# Patient Record
Sex: FEMALE | Race: WHITE | NOT HISPANIC OR LATINO | URBAN - METROPOLITAN AREA
[De-identification: names, ages, dates, MRNs, and addresses within clinical notes are randomized per-mention and may not be internally consistent; named-entity substitution may affect disease eponyms.]

---

## 2020-03-15 ENCOUNTER — INPATIENT (INPATIENT)
Facility: HOSPITAL | Age: 65
LOS: 10 days | Discharge: NURSING, REHAB OR LONG-TERM CARE | DRG: 194 | End: 2020-03-26
Attending: INTERNAL MEDICINE | Admitting: INTERNAL MEDICINE
Payer: COMMERCIAL

## 2020-03-15 VITALS
RESPIRATION RATE: 18 BRPM | HEART RATE: 73 BPM | SYSTOLIC BLOOD PRESSURE: 113 MMHG | OXYGEN SATURATION: 96 % | TEMPERATURE: 101 F | DIASTOLIC BLOOD PRESSURE: 72 MMHG

## 2020-03-15 DIAGNOSIS — E03.9 HYPOTHYROIDISM, UNSPECIFIED: ICD-10-CM

## 2020-03-15 DIAGNOSIS — Z29.9 ENCOUNTER FOR PROPHYLACTIC MEASURES, UNSPECIFIED: ICD-10-CM

## 2020-03-15 DIAGNOSIS — M79.7 FIBROMYALGIA: ICD-10-CM

## 2020-03-15 DIAGNOSIS — Z91.89 OTHER SPECIFIED PERSONAL RISK FACTORS, NOT ELSEWHERE CLASSIFIED: ICD-10-CM

## 2020-03-15 DIAGNOSIS — M06.9 RHEUMATOID ARTHRITIS, UNSPECIFIED: ICD-10-CM

## 2020-03-15 DIAGNOSIS — R63.8 OTHER SYMPTOMS AND SIGNS CONCERNING FOOD AND FLUID INTAKE: ICD-10-CM

## 2020-03-15 DIAGNOSIS — F32.9 MAJOR DEPRESSIVE DISORDER, SINGLE EPISODE, UNSPECIFIED: ICD-10-CM

## 2020-03-15 DIAGNOSIS — M81.0 AGE-RELATED OSTEOPOROSIS WITHOUT CURRENT PATHOLOGICAL FRACTURE: ICD-10-CM

## 2020-03-15 LAB
ALBUMIN SERPL ELPH-MCNC: 3.5 G/DL — SIGNIFICANT CHANGE UP (ref 3.3–5)
ALP SERPL-CCNC: 74 U/L — SIGNIFICANT CHANGE UP (ref 40–120)
ALT FLD-CCNC: 97 U/L — HIGH (ref 10–45)
ANION GAP SERPL CALC-SCNC: 11 MMOL/L — SIGNIFICANT CHANGE UP (ref 5–17)
APPEARANCE UR: CLEAR — SIGNIFICANT CHANGE UP
APTT BLD: 31.9 SEC — SIGNIFICANT CHANGE UP (ref 27.5–36.3)
AST SERPL-CCNC: 112 U/L — HIGH (ref 10–40)
BASOPHILS # BLD AUTO: 0.03 K/UL — SIGNIFICANT CHANGE UP (ref 0–0.2)
BASOPHILS NFR BLD AUTO: 0.7 % — SIGNIFICANT CHANGE UP (ref 0–2)
BILIRUB SERPL-MCNC: 0.3 MG/DL — SIGNIFICANT CHANGE UP (ref 0.2–1.2)
BILIRUB UR-MCNC: ABNORMAL
BUN SERPL-MCNC: 11 MG/DL — SIGNIFICANT CHANGE UP (ref 7–23)
CALCIUM SERPL-MCNC: 8.8 MG/DL — SIGNIFICANT CHANGE UP (ref 8.4–10.5)
CHLORIDE SERPL-SCNC: 97 MMOL/L — SIGNIFICANT CHANGE UP (ref 96–108)
CO2 SERPL-SCNC: 25 MMOL/L — SIGNIFICANT CHANGE UP (ref 22–31)
COLOR SPEC: YELLOW — SIGNIFICANT CHANGE UP
CREAT SERPL-MCNC: 0.77 MG/DL — SIGNIFICANT CHANGE UP (ref 0.5–1.3)
CRP SERPL-MCNC: 2.8 MG/DL — HIGH (ref 0–0.4)
DIFF PNL FLD: NEGATIVE — SIGNIFICANT CHANGE UP
EOSINOPHIL # BLD AUTO: 0.02 K/UL — SIGNIFICANT CHANGE UP (ref 0–0.5)
EOSINOPHIL NFR BLD AUTO: 0.5 % — SIGNIFICANT CHANGE UP (ref 0–6)
FERRITIN SERPL-MCNC: 104 NG/ML — SIGNIFICANT CHANGE UP (ref 15–150)
GAS PNL BLDV: SIGNIFICANT CHANGE UP
GLUCOSE SERPL-MCNC: 92 MG/DL — SIGNIFICANT CHANGE UP (ref 70–99)
GLUCOSE UR QL: NEGATIVE — SIGNIFICANT CHANGE UP
HCT VFR BLD CALC: 39.3 % — SIGNIFICANT CHANGE UP (ref 34.5–45)
HGB BLD-MCNC: 12.1 G/DL — SIGNIFICANT CHANGE UP (ref 11.5–15.5)
IMM GRANULOCYTES NFR BLD AUTO: 0.2 % — SIGNIFICANT CHANGE UP (ref 0–1.5)
INR BLD: 1.14 — SIGNIFICANT CHANGE UP (ref 0.88–1.16)
KETONES UR-MCNC: NEGATIVE — SIGNIFICANT CHANGE UP
LACTATE SERPL-SCNC: 1.3 MMOL/L — SIGNIFICANT CHANGE UP (ref 0.5–2)
LDH SERPL L TO P-CCNC: 436 U/L — HIGH (ref 50–242)
LEUKOCYTE ESTERASE UR-ACNC: ABNORMAL
LYMPHOCYTES # BLD AUTO: 1.22 K/UL — SIGNIFICANT CHANGE UP (ref 1–3.3)
LYMPHOCYTES # BLD AUTO: 29.2 % — SIGNIFICANT CHANGE UP (ref 13–44)
MCHC RBC-ENTMCNC: 25.2 PG — LOW (ref 27–34)
MCHC RBC-ENTMCNC: 30.8 GM/DL — LOW (ref 32–36)
MCV RBC AUTO: 81.9 FL — SIGNIFICANT CHANGE UP (ref 80–100)
MONOCYTES # BLD AUTO: 0.54 K/UL — SIGNIFICANT CHANGE UP (ref 0–0.9)
MONOCYTES NFR BLD AUTO: 12.9 % — SIGNIFICANT CHANGE UP (ref 2–14)
NEUTROPHILS # BLD AUTO: 2.36 K/UL — SIGNIFICANT CHANGE UP (ref 1.8–7.4)
NEUTROPHILS NFR BLD AUTO: 56.5 % — SIGNIFICANT CHANGE UP (ref 43–77)
NITRITE UR-MCNC: NEGATIVE — SIGNIFICANT CHANGE UP
NRBC # BLD: 0 /100 WBCS — SIGNIFICANT CHANGE UP (ref 0–0)
PH UR: 6 — SIGNIFICANT CHANGE UP (ref 5–8)
PLATELET # BLD AUTO: 227 K/UL — SIGNIFICANT CHANGE UP (ref 150–400)
POTASSIUM SERPL-MCNC: 4.6 MMOL/L — SIGNIFICANT CHANGE UP (ref 3.5–5.3)
POTASSIUM SERPL-SCNC: 4.6 MMOL/L — SIGNIFICANT CHANGE UP (ref 3.5–5.3)
PROT SERPL-MCNC: 8.2 G/DL — SIGNIFICANT CHANGE UP (ref 6–8.3)
PROT UR-MCNC: ABNORMAL MG/DL
PROTHROM AB SERPL-ACNC: 13.1 SEC — HIGH (ref 10–12.9)
RAPID RVP RESULT: SIGNIFICANT CHANGE UP
RBC # BLD: 4.8 M/UL — SIGNIFICANT CHANGE UP (ref 3.8–5.2)
RBC # FLD: 17.1 % — HIGH (ref 10.3–14.5)
SODIUM SERPL-SCNC: 133 MMOL/L — LOW (ref 135–145)
SP GR SPEC: >=1.03 — SIGNIFICANT CHANGE UP (ref 1–1.03)
UROBILINOGEN FLD QL: 0.2 E.U./DL — SIGNIFICANT CHANGE UP
WBC # BLD: 4.18 K/UL — SIGNIFICANT CHANGE UP (ref 3.8–10.5)
WBC # FLD AUTO: 4.18 K/UL — SIGNIFICANT CHANGE UP (ref 3.8–10.5)

## 2020-03-15 PROCEDURE — 93010 ELECTROCARDIOGRAM REPORT: CPT

## 2020-03-15 PROCEDURE — 71045 X-RAY EXAM CHEST 1 VIEW: CPT | Mod: 26

## 2020-03-15 PROCEDURE — 99285 EMERGENCY DEPT VISIT HI MDM: CPT

## 2020-03-15 RX ORDER — HYDROXYCHLOROQUINE SULFATE 200 MG
200 TABLET ORAL EVERY 12 HOURS
Refills: 0 | Status: DISCONTINUED | OUTPATIENT
Start: 2020-03-15 | End: 2020-03-26

## 2020-03-15 RX ORDER — PROPRANOLOL HCL 160 MG
80 CAPSULE, EXTENDED RELEASE 24HR ORAL DAILY
Refills: 0 | Status: DISCONTINUED | OUTPATIENT
Start: 2020-03-15 | End: 2020-03-26

## 2020-03-15 RX ORDER — SODIUM CHLORIDE 9 MG/ML
1000 INJECTION INTRAMUSCULAR; INTRAVENOUS; SUBCUTANEOUS ONCE
Refills: 0 | Status: COMPLETED | OUTPATIENT
Start: 2020-03-15 | End: 2020-03-15

## 2020-03-15 RX ORDER — LEVOTHYROXINE SODIUM 125 MCG
0 TABLET ORAL
Qty: 0 | Refills: 0 | DISCHARGE

## 2020-03-15 RX ORDER — CITALOPRAM 10 MG/1
1 TABLET, FILM COATED ORAL
Qty: 0 | Refills: 0 | DISCHARGE

## 2020-03-15 RX ORDER — ALENDRONATE SODIUM 70 MG/1
1 TABLET ORAL
Qty: 0 | Refills: 0 | DISCHARGE

## 2020-03-15 RX ORDER — SULFASALAZINE 500 MG
0 TABLET ORAL
Qty: 0 | Refills: 0 | DISCHARGE

## 2020-03-15 RX ORDER — HYDROXYCHLOROQUINE SULFATE 200 MG
0 TABLET ORAL
Qty: 0 | Refills: 0 | DISCHARGE

## 2020-03-15 RX ORDER — ENOXAPARIN SODIUM 100 MG/ML
40 INJECTION SUBCUTANEOUS EVERY 24 HOURS
Refills: 0 | Status: DISCONTINUED | OUTPATIENT
Start: 2020-03-15 | End: 2020-03-26

## 2020-03-15 RX ORDER — PANTOPRAZOLE SODIUM 20 MG/1
0 TABLET, DELAYED RELEASE ORAL
Qty: 0 | Refills: 0 | DISCHARGE

## 2020-03-15 RX ORDER — PANTOPRAZOLE SODIUM 20 MG/1
20 TABLET, DELAYED RELEASE ORAL
Refills: 0 | Status: DISCONTINUED | OUTPATIENT
Start: 2020-03-15 | End: 2020-03-26

## 2020-03-15 RX ORDER — CITALOPRAM 10 MG/1
40 TABLET, FILM COATED ORAL DAILY
Refills: 0 | Status: DISCONTINUED | OUTPATIENT
Start: 2020-03-15 | End: 2020-03-26

## 2020-03-15 RX ORDER — SULFASALAZINE 500 MG
500 TABLET ORAL EVERY 8 HOURS
Refills: 0 | Status: DISCONTINUED | OUTPATIENT
Start: 2020-03-15 | End: 2020-03-26

## 2020-03-15 RX ORDER — CEFTRIAXONE 500 MG/1
1000 INJECTION, POWDER, FOR SOLUTION INTRAMUSCULAR; INTRAVENOUS ONCE
Refills: 0 | Status: COMPLETED | OUTPATIENT
Start: 2020-03-15 | End: 2020-03-15

## 2020-03-15 RX ORDER — ASPIRIN/CALCIUM CARB/MAGNESIUM 324 MG
81 TABLET ORAL DAILY
Refills: 0 | Status: DISCONTINUED | OUTPATIENT
Start: 2020-03-15 | End: 2020-03-26

## 2020-03-15 RX ORDER — ACETAMINOPHEN 500 MG
650 TABLET ORAL EVERY 6 HOURS
Refills: 0 | Status: DISCONTINUED | OUTPATIENT
Start: 2020-03-15 | End: 2020-03-23

## 2020-03-15 RX ORDER — ACETAMINOPHEN 500 MG
650 TABLET ORAL ONCE
Refills: 0 | Status: COMPLETED | OUTPATIENT
Start: 2020-03-15 | End: 2020-03-15

## 2020-03-15 RX ORDER — MAGNESIUM OXIDE 400 MG ORAL TABLET 241.3 MG
1 TABLET ORAL
Qty: 0 | Refills: 0 | DISCHARGE

## 2020-03-15 RX ORDER — SULFASALAZINE 500 MG
500 TABLET ORAL DAILY
Refills: 0 | Status: DISCONTINUED | OUTPATIENT
Start: 2020-03-15 | End: 2020-03-15

## 2020-03-15 RX ORDER — LEVOTHYROXINE SODIUM 125 MCG
25 TABLET ORAL DAILY
Refills: 0 | Status: DISCONTINUED | OUTPATIENT
Start: 2020-03-16 | End: 2020-03-26

## 2020-03-15 RX ORDER — AZITHROMYCIN 500 MG/1
500 TABLET, FILM COATED ORAL EVERY 24 HOURS
Refills: 0 | Status: COMPLETED | OUTPATIENT
Start: 2020-03-15 | End: 2020-03-17

## 2020-03-15 RX ORDER — ASPIRIN/CALCIUM CARB/MAGNESIUM 324 MG
1 TABLET ORAL
Qty: 0 | Refills: 0 | DISCHARGE

## 2020-03-15 RX ORDER — PROPRANOLOL HCL 160 MG
0 CAPSULE, EXTENDED RELEASE 24HR ORAL
Qty: 0 | Refills: 0 | DISCHARGE

## 2020-03-15 RX ORDER — CEFTRIAXONE 500 MG/1
1000 INJECTION, POWDER, FOR SOLUTION INTRAMUSCULAR; INTRAVENOUS EVERY 24 HOURS
Refills: 0 | Status: COMPLETED | OUTPATIENT
Start: 2020-03-16 | End: 2020-03-19

## 2020-03-15 RX ADMIN — Medication 500 MILLIGRAM(S): at 19:50

## 2020-03-15 RX ADMIN — Medication 81 MILLIGRAM(S): at 19:50

## 2020-03-15 RX ADMIN — CEFTRIAXONE 100 MILLIGRAM(S): 500 INJECTION, POWDER, FOR SOLUTION INTRAMUSCULAR; INTRAVENOUS at 11:00

## 2020-03-15 RX ADMIN — Medication 100 MILLIGRAM(S): at 19:50

## 2020-03-15 RX ADMIN — ENOXAPARIN SODIUM 40 MILLIGRAM(S): 100 INJECTION SUBCUTANEOUS at 19:50

## 2020-03-15 RX ADMIN — Medication 200 MILLIGRAM(S): at 19:50

## 2020-03-15 RX ADMIN — Medication 650 MILLIGRAM(S): at 11:22

## 2020-03-15 RX ADMIN — CEFTRIAXONE 1000 MILLIGRAM(S): 500 INJECTION, POWDER, FOR SOLUTION INTRAMUSCULAR; INTRAVENOUS at 11:54

## 2020-03-15 RX ADMIN — AZITHROMYCIN 500 MILLIGRAM(S): 500 TABLET, FILM COATED ORAL at 19:49

## 2020-03-15 RX ADMIN — Medication 650 MILLIGRAM(S): at 14:56

## 2020-03-15 RX ADMIN — SODIUM CHLORIDE 1000 MILLILITER(S): 9 INJECTION INTRAMUSCULAR; INTRAVENOUS; SUBCUTANEOUS at 10:48

## 2020-03-15 NOTE — ED ADULT NURSE NOTE - NSIMPLEMENTINTERV_GEN_ALL_ED
Implemented All Fall Risk Interventions:  [place] to call system. Call bell, personal items and telephone within reach. Instruct patient to call for assistance. Room bathroom lighting operational. Non-slip footwear when patient is off stretcher. Physically safe environment: no spills, clutter or unnecessary equipment. Stretcher in lowest position, wheels locked, appropriate side rails in place. Provide visual cue, wrist band, yellow gown, etc. Monitor gait and stability. Monitor for mental status changes and reorient to person, place, and time. Review medications for side effects contributing to fall risk. Reinforce activity limits and safety measures with patient and family.

## 2020-03-15 NOTE — H&P ADULT - PROBLEM SELECTOR PLAN 1
5 day hx of subjective fever, dry cough, with elevated CRP and LDH (pending ferritin), no lymphopenia, concern for COVID-19 [facility]  - f/u COVID-19 PCR  - C/w ceftriaxone 1g daily and azithromycin 500 for 3 days for CAP treatment for now  - f/u urine legionella

## 2020-03-15 NOTE — ED ADULT NURSE NOTE - OTHER COMPLAINTS
Transferred from [facility] to /o [place] [address]. Per EMS, someone else on the patient's floor was confirmed positive.

## 2020-03-15 NOTE — PATIENT PROFILE ADULT - STATED REASON FOR ADMISSION
Known positive Covid-19 on the same floor patient is on at [facility] and started presenting with fevers and cough.

## 2020-03-15 NOTE — H&P ADULT - HISTORY OF PRESENT ILLNESS
64F with rheumatoid arthritis, fibromyalgia, presents from [facility] complaining of a 4-5 day of subjective fevers and a dry cough (resolved approximately 2 days ago). Patient expressed concern for COVID-19 given that numerous residents at the facility were in contact with a COVID-19 positive patient. Patient had not been directly in contact with any COVID-19 positive patient, and her roommate is not positive as well, nor symptomatic.    63 y/o F living in a nursing home PMHx RA, and fibromyalgia presents to the ED with c/o fever x 5 D and a cough that had resolved 2 D ago. Denies SOB, abdominal pain, nausea, vomiting or other complaints. Pt is concerned for COVID19 though she was not in direct contact w/ a +COVID19 pt. Room mate asymptomatic. 64F with rheumatoid arthritis, fibromyalgia, presents from [facility] complaining of a 4-5 day of subjective fevers and a dry cough (resolved approximately 2 days ago). Patient expressed concern for COVID-19 given that numerous residents at the facility were in contact with a COVID-19 positive patient. Patient had not been directly in contact with any COVID-19 positive patient, and her roommate is not positive as well, nor symptomatic. Patient during time of evaluation in the ED felt "fine, I am only concerned whether or not I have the virus, and why I am sweating so much. I feel very hot." Patent denies chills, nausea, vomiting, changes in bowel movement (had 1 bowel movement during encounter). Patient otherwise feels close to her baseline, when asked scale 1-10, 10 being 100% normal, patient stated 8-9.    ED vitals: T 101.3, HR 73, /72, RR 18, 96%  ED labs: Na 113, , ALT 97, U/A dirty (negative), RVP negative  ED studies: none  ED course: 1L NS bolus, Ceftriaxone 1g at 11 AM 64F with rheumatoid arthritis, fibromyalgia, presents from [facility] complaining of a 4-5 day of subjective fevers and a dry cough (resolved approximately 2 days ago). Patient expressed concern for COVID-19 given that numerous residents at the facility were in contact with a COVID-19 positive patient. Patient had not been directly in contact with any COVID-19 positive patient, and her roommate is not positive as well, nor symptomatic. Patient during time of evaluation in the ED felt "fine, I am only concerned whether or not I have the virus, and why I am sweating so much. I feel very hot." Patent denies chills, nausea, vomiting, changes in bowel movement (had 1 bowel movement during encounter). Patient otherwise feels close to her baseline, when asked scale 1-10, 10 being 100% normal, patient stated 8-9.    ED vitals: T 101.3, HR 73, /72, RR 18, 96%  ED labs: Na 113, , ALT 97, U/A dirty (negative), RVP negative  ED studies: CXR w/ elevated R hemidiaphragm, b/l increased interstitial markings  ED course: 1L NS bolus, Ceftriaxone 1g at 11 AM 64F with rheumatoid arthritis, fibromyalgia, presents from [facility] complaining of a 4-5 day of subjective fevers and a dry cough (resolved approximately 2 days ago). Patient expressed concern for COVID-19 given that numerous residents at the facility were in contact with a COVID-19 positive patient. Patient had not been directly in contact with any COVID-19 positive patient, and her roommate is not positive as well, nor symptomatic. Patient during time of evaluation in the ED felt "fine, I am only concerned whether or not I have the virus, and why I am sweating so much. I feel very hot." Patent denies chills, nausea, vomiting, changes in bowel movement (had 1 bowel movement during encounter). 12 point review of system negative for everything except some intermittent joint pain related to her RA. Patient otherwise feels close to her baseline, when asked scale 1-10, 10 being 100% normal, patient stated 8-9.    ED vitals: T 101.3, HR 73, /72, RR 18, 96%  ED labs: Na 113, , ALT 97, U/A dirty (negative), RVP negative  ED studies: CXR w/ elevated R hemidiaphragm, b/l increased interstitial markings  ED course: 1L NS bolus, Ceftriaxone 1g at 11 AM

## 2020-03-15 NOTE — H&P ADULT - NSHPPHYSICALEXAM_GEN_ALL_CORE
VITAL SIGNS:  T(C): 36.9 (03-15-20 @ 15:45), Max: 38.5 (03-15-20 @ 11:55)  T(F): 98.4 (03-15-20 @ 15:45), Max: 101.3 (03-15-20 @ 11:55)  HR: 64 (03-15-20 @ 15:45) (64 - 73)  BP: 116/59 (03-15-20 @ 15:45) (106/52 - 116/59)  BP(mean): --  RR: 18 (03-15-20 @ 15:45) (18 - 18)  SpO2: 98% (03-15-20 @ 15:45) (96% - 98%)  Wt(kg): --    PHYSICAL EXAM:    Constitutional: WDWN, lying comfortably in bed, NAD  Head: Nc/At  Eyes: PERRL, EOMI, clear conjunctiva  ENT: no nasal discharge; MMM  Neck: supple; no JVD  Respiratory: CTA b/l, no wheezes, no crackles  Cardiac: +S1/S2, +RRR, no murmurs, rubs, or gallops  Gastrointestinal: obese, soft, nontender, nondistended, normoactive bowel sounds x4  Extremities: WWP, no clubbing or cyanosis, no peripheral edema  Vascular: 2+ radial and DP pulses b/l  Dermatologic: skin warm, dry and intact, no rashes, wounds  Neurologic: AAOx3, CNII-XII grossly intact, no focal deficits, moderate handgrip, 3/5 b/l LE strength, 4/5 b/l UE strength, b/l toe deformities 2/2 RA; bedbound

## 2020-03-15 NOTE — H&P ADULT - PROBLEM SELECTOR PLAN 3
Hx of RA, w/ b/l toe deformities, sig joint pain, patient is bedbound  - C/w prednisone 5 mg daily  - C/w sulfasalazine 500 mg TID  - C/w Plaquenil 200 mg BID  - C/w protonix 20 mg daily

## 2020-03-15 NOTE — ED ADULT TRIAGE NOTE - OTHER COMPLAINTS
Transferred from [facility] to /o [place] [address]. Per EMS, someone else on the patient's floor was confirmed positive.

## 2020-03-15 NOTE — H&P ADULT - NSICDXPASTMEDICALHX_GEN_ALL_CORE_FT
PAST MEDICAL HISTORY:  Depression     Fibromyalgia     Hypothyroid     Osteoporosis     RA (rheumatoid arthritis)

## 2020-03-15 NOTE — ED ADULT NURSE NOTE - OBJECTIVE STATEMENT
pt transferred from [facility] with fever , no cough ,resp difficulties, CP pt transferred from [facility] with fever and possible covis19 exposure in NH , no cough ,respiratory distress ,CP, chills, pt alert and oriented x 3

## 2020-03-15 NOTE — ED CLERICAL - NS ED CLERK NOTE PRE-ARRIVAL INFORMATION; ADDITIONAL PRE-ARRIVAL INFORMATION
63 Y/O F CURLY YOUNGER BEING SENT BY DR JOSETTE GUAN FROM [facility] FOR COUGH AND FEVER DR GUAN CAN BE REACHED @ [phone]

## 2020-03-15 NOTE — H&P ADULT - PROBLEM SELECTOR PLAN 9
1) PCP Contacted on Admission: (Y/N) --> Name & Phone #: Dr. Padilla, but Dr. Cordero covering  2) Date of Contact with PCP: 3/15/2020  3) PCP Contacted at Discharge: (Y/N, N/A)  4) Summary of Handoff Given to PCP:   5) Post-Discharge Appointment Date and Location:

## 2020-03-15 NOTE — H&P ADULT - ASSESSMENT
64F with rheumatoid arthritis, fibromyalgia, presents from [facility] complaining of a 4-5 day of subjective fevers and a dry cough (resolved approximately 2 days ago). Patient is otherwise feeling well, only complaining of sweating. Admitted for COVID r/o and treatment of CAP

## 2020-03-15 NOTE — ED PROVIDER NOTE - CLINICAL SUMMARY MEDICAL DECISION MAKING FREE TEXT BOX
63 y/o F PMHx RA, and fibromyalgia p/w fever x 5 D and reports a cough that resolved 2 D ago. Pt in nursing home w/ possible +COVID19 sick contact. Will obtain labs, CXR, COVID-19 r/o, empiric antibiotics and reassess.

## 2020-03-15 NOTE — H&P ADULT - NSHPLABSRESULTS_GEN_ALL_CORE
LABS:                         12.1   4.18  )-----------( 227      ( 15 Mar 2020 10:41 )             39.3     03-15    133<L>  |  97  |  11  ----------------------------<  92  4.6   |  25  |  0.77    Ca    8.8      15 Mar 2020 10:41    TPro  8.2  /  Alb  3.5  /  TBili  0.3  /  DBili  x   /  AST  112<H>  /  ALT  97<H>  /  AlkPhos  74  03-15    PT/INR - ( 15 Mar 2020 10:41 )   PT: 13.1 sec;   INR: 1.14          PTT - ( 15 Mar 2020 10:41 )  PTT:31.9 sec  Urinalysis Basic - ( 15 Mar 2020 11:43 )    Color: Yellow / Appearance: Clear / SG: >=1.030 / pH: x  Gluc: x / Ketone: NEGATIVE  / Bili: Small / Urobili: 0.2 E.U./dL   Blood: x / Protein: Trace mg/dL / Nitrite: NEGATIVE   Leuk Esterase: Trace / RBC: < 5 /HPF / WBC < 5 /HPF   Sq Epi: x / Non Sq Epi: Moderate /HPF / Bacteria: Present /HPF    Lactate, Blood: 1.3 mmol/L (03-15 @ 10:41)    RADIOLOGY, EKG & ADDITIONAL TESTS:  None LABS:                         12.1   4.18  )-----------( 227      ( 15 Mar 2020 10:41 )             39.3     03-15    133<L>  |  97  |  11  ----------------------------<  92  4.6   |  25  |  0.77    Ca    8.8      15 Mar 2020 10:41    TPro  8.2  /  Alb  3.5  /  TBili  0.3  /  DBili  x   /  AST  112<H>  /  ALT  97<H>  /  AlkPhos  74  03-15    PT/INR - ( 15 Mar 2020 10:41 )   PT: 13.1 sec;   INR: 1.14          PTT - ( 15 Mar 2020 10:41 )  PTT:31.9 sec  Urinalysis Basic - ( 15 Mar 2020 11:43 )    Color: Yellow / Appearance: Clear / SG: >=1.030 / pH: x  Gluc: x / Ketone: NEGATIVE  / Bili: Small / Urobili: 0.2 E.U./dL   Blood: x / Protein: Trace mg/dL / Nitrite: NEGATIVE   Leuk Esterase: Trace / RBC: < 5 /HPF / WBC < 5 /HPF   Sq Epi: x / Non Sq Epi: Moderate /HPF / Bacteria: Present /HPF    Lactate, Blood: 1.3 mmol/L (03-15 @ 10:41)

## 2020-03-15 NOTE — ED PROVIDER NOTE - PMH
Depression    Hypothyroid    Osteoporosis    RA (rheumatoid arthritis) Depression    Fibromyalgia    Hypothyroid    Osteoporosis    RA (rheumatoid arthritis)

## 2020-03-16 LAB
ANION GAP SERPL CALC-SCNC: 12 MMOL/L — SIGNIFICANT CHANGE UP (ref 5–17)
BUN SERPL-MCNC: 10 MG/DL — SIGNIFICANT CHANGE UP (ref 7–23)
CALCIUM SERPL-MCNC: 8.3 MG/DL — LOW (ref 8.4–10.5)
CHLORIDE SERPL-SCNC: 102 MMOL/L — SIGNIFICANT CHANGE UP (ref 96–108)
CO2 SERPL-SCNC: 23 MMOL/L — SIGNIFICANT CHANGE UP (ref 22–31)
CREAT SERPL-MCNC: 0.68 MG/DL — SIGNIFICANT CHANGE UP (ref 0.5–1.3)
GLUCOSE SERPL-MCNC: 94 MG/DL — SIGNIFICANT CHANGE UP (ref 70–99)
HCT VFR BLD CALC: 36.7 % — SIGNIFICANT CHANGE UP (ref 34.5–45)
HCV AB S/CO SERPL IA: 0.16 S/CO — SIGNIFICANT CHANGE UP
HCV AB SERPL-IMP: SIGNIFICANT CHANGE UP
HGB BLD-MCNC: 11.1 G/DL — LOW (ref 11.5–15.5)
MAGNESIUM SERPL-MCNC: 1.8 MG/DL — SIGNIFICANT CHANGE UP (ref 1.6–2.6)
MCHC RBC-ENTMCNC: 24.9 PG — LOW (ref 27–34)
MCHC RBC-ENTMCNC: 30.2 GM/DL — LOW (ref 32–36)
MCV RBC AUTO: 82.5 FL — SIGNIFICANT CHANGE UP (ref 80–100)
NRBC # BLD: 0 /100 WBCS — SIGNIFICANT CHANGE UP (ref 0–0)
PLATELET # BLD AUTO: 196 K/UL — SIGNIFICANT CHANGE UP (ref 150–400)
POTASSIUM SERPL-MCNC: 3.8 MMOL/L — SIGNIFICANT CHANGE UP (ref 3.5–5.3)
POTASSIUM SERPL-SCNC: 3.8 MMOL/L — SIGNIFICANT CHANGE UP (ref 3.5–5.3)
RBC # BLD: 4.45 M/UL — SIGNIFICANT CHANGE UP (ref 3.8–5.2)
RBC # FLD: 17.1 % — HIGH (ref 10.3–14.5)
SODIUM SERPL-SCNC: 137 MMOL/L — SIGNIFICANT CHANGE UP (ref 135–145)
WBC # BLD: 3.09 K/UL — LOW (ref 3.8–10.5)
WBC # FLD AUTO: 3.09 K/UL — LOW (ref 3.8–10.5)

## 2020-03-16 PROCEDURE — 99233 SBSQ HOSP IP/OBS HIGH 50: CPT | Mod: GC

## 2020-03-16 RX ORDER — MAGNESIUM SULFATE 500 MG/ML
1 VIAL (ML) INJECTION ONCE
Refills: 0 | Status: COMPLETED | OUTPATIENT
Start: 2020-03-16 | End: 2020-03-16

## 2020-03-16 RX ORDER — POTASSIUM CHLORIDE 20 MEQ
20 PACKET (EA) ORAL ONCE
Refills: 0 | Status: COMPLETED | OUTPATIENT
Start: 2020-03-16 | End: 2020-03-16

## 2020-03-16 RX ADMIN — CITALOPRAM 40 MILLIGRAM(S): 10 TABLET, FILM COATED ORAL at 11:46

## 2020-03-16 RX ADMIN — Medication 500 MILLIGRAM(S): at 21:50

## 2020-03-16 RX ADMIN — Medication 200 MILLIGRAM(S): at 19:13

## 2020-03-16 RX ADMIN — ENOXAPARIN SODIUM 40 MILLIGRAM(S): 100 INJECTION SUBCUTANEOUS at 13:01

## 2020-03-16 RX ADMIN — Medication 500 MILLIGRAM(S): at 06:12

## 2020-03-16 RX ADMIN — CEFTRIAXONE 100 MILLIGRAM(S): 500 INJECTION, POWDER, FOR SOLUTION INTRAMUSCULAR; INTRAVENOUS at 11:46

## 2020-03-16 RX ADMIN — Medication 80 MILLIGRAM(S): at 06:22

## 2020-03-16 RX ADMIN — Medication 100 GRAM(S): at 07:52

## 2020-03-16 RX ADMIN — Medication 650 MILLIGRAM(S): at 08:14

## 2020-03-16 RX ADMIN — Medication 650 MILLIGRAM(S): at 07:23

## 2020-03-16 RX ADMIN — Medication 20 MILLIEQUIVALENT(S): at 07:51

## 2020-03-16 RX ADMIN — Medication 200 MILLIGRAM(S): at 06:12

## 2020-03-16 RX ADMIN — AZITHROMYCIN 500 MILLIGRAM(S): 500 TABLET, FILM COATED ORAL at 19:13

## 2020-03-16 RX ADMIN — Medication 100 MILLIGRAM(S): at 21:50

## 2020-03-16 RX ADMIN — Medication 100 MILLIGRAM(S): at 06:12

## 2020-03-16 RX ADMIN — Medication 5 MILLIGRAM(S): at 06:12

## 2020-03-16 RX ADMIN — Medication 100 MILLIGRAM(S): at 13:01

## 2020-03-16 RX ADMIN — Medication 81 MILLIGRAM(S): at 11:46

## 2020-03-16 RX ADMIN — Medication 25 MICROGRAM(S): at 06:12

## 2020-03-16 RX ADMIN — Medication 500 MILLIGRAM(S): at 13:01

## 2020-03-16 RX ADMIN — PANTOPRAZOLE SODIUM 20 MILLIGRAM(S): 20 TABLET, DELAYED RELEASE ORAL at 06:12

## 2020-03-16 NOTE — PROGRESS NOTE ADULT - PROBLEM SELECTOR PLAN 1
5 day hx of subjective fever, dry cough, with elevated CRP and LDH (pending ferritin), no lymphopenia, concern for COVID-[facility]  - f/u COVID-19 PCR  - C/w ceftriaxone 1g daily and azithromycin 500 for 3 days for CAP treatment for now  - f/u urine legionella

## 2020-03-16 NOTE — PROGRESS NOTE ADULT - ASSESSMENT
64F with rheumatoid arthritis, fibromyalgia, presents from [facility] complaining of a 4-5 day of subjective fevers and a dry cough (resolved approximately 2 days ago). Patient is otherwise feeling well, only complaining of sweating. Admitted for COVID r/o and treatment of CAP

## 2020-03-16 NOTE — PROGRESS NOTE ADULT - SUBJECTIVE AND OBJECTIVE BOX
*** NOTE IN PROGRESS ***    INTERVAL HPI/OVERNIGHT EVENTS: No acute events overnight.    SUBJECTIVE: Patient seen and examined at bedside.    OBJECTIVE:    VITAL SIGNS:  ICU Vital Signs Last 24 Hrs  T(C): 36.4 (16 Mar 2020 08:59), Max: 38.5 (15 Mar 2020 11:55)  T(F): 97.6 (16 Mar 2020 08:59), Max: 101.3 (15 Mar 2020 11:55)  HR: 6 (16 Mar 2020 08:59) (6 - 94)  BP: 118/46 (16 Mar 2020 08:59) (106/52 - 141/97)  BP(mean): 67 (16 Mar 2020 08:59) (67 - 84)  ABP: --  ABP(mean): --  RR: 18 (16 Mar 2020 08:59) (16 - 18)  SpO2: 97% (16 Mar 2020 08:59) (93% - 98%)        03-15 @ 07:01  -  03-16 @ 07:00  --------------------------------------------------------  IN: 0 mL / OUT: 400 mL / NET: -400 mL      CAPILLARY BLOOD GLUCOSE          PHYSICAL EXAM:  Constitutional: WDWN, lying comfortably in bed, NAD  Head: Nc/At  Eyes: PERRL, EOMI, clear conjunctiva  ENT: no nasal discharge; MMM  Neck: supple; no JVD  Respiratory: CTA b/l, no wheezes, no crackles  Cardiac: +S1/S2, +RRR, no murmurs, rubs, or gallops  Gastrointestinal: obese, soft, nontender, nondistended, normoactive bowel sounds x4  Extremities: WWP, no clubbing or cyanosis, no peripheral edema  Vascular: 2+ radial and DP pulses b/l  Dermatologic: skin warm, dry and intact, no rashes, wounds  Neurologic: AAOx3, CNII-XII grossly intact, no focal deficits, moderate handgrip, 3/5 b/l LE strength, 4/5 b/l UE strength, b/l toe deformities 2/2 RA; bedbound       MEDICATIONS:  MEDICATIONS  (STANDING):  aspirin enteric coated 81 milliGRAM(s) Oral daily  azithromycin   Tablet 500 milliGRAM(s) Oral every 24 hours  cefTRIAXone   IVPB 1000 milliGRAM(s) IV Intermittent every 24 hours  citalopram 40 milliGRAM(s) Oral daily  enoxaparin Injectable 40 milliGRAM(s) SubCutaneous every 24 hours  hydroxychloroquine 200 milliGRAM(s) Oral every 12 hours  levothyroxine 25 MICROGram(s) Oral daily  pantoprazole    Tablet 20 milliGRAM(s) Oral before breakfast  predniSONE   Tablet 5 milliGRAM(s) Oral daily  pregabalin 100 milliGRAM(s) Oral three times a day  propranolol LA 80 milliGRAM(s) Oral daily  sulfaSALAzine 500 milliGRAM(s) Oral every 8 hours    MEDICATIONS  (PRN):  acetaminophen   Tablet .. 650 milliGRAM(s) Oral every 6 hours PRN Temp greater or equal to 38C (100.4F), Mild Pain (1 - 3)      ALLERGIES:  Allergies    clindamycin (Unknown)  piperacillin (Unknown)  vancomycin (Unknown)    Intolerances        LABS:                        11.1   3.09  )-----------( 196      ( 16 Mar 2020 05:46 )             36.7     03-16    137  |  102  |  10  ----------------------------<  94  3.8   |  23  |  0.68    Ca    8.3<L>      16 Mar 2020 05:47  Mg     1.8     03-16    TPro  8.2  /  Alb  3.5  /  TBili  0.3  /  DBili  x   /  AST  112<H>  /  ALT  97<H>  /  AlkPhos  74  03-15    PT/INR - ( 15 Mar 2020 10:41 )   PT: 13.1 sec;   INR: 1.14          PTT - ( 15 Mar 2020 10:41 )  PTT:31.9 sec  Urinalysis Basic - ( 15 Mar 2020 11:43 )    Color: Yellow / Appearance: Clear / SG: >=1.030 / pH: x  Gluc: x / Ketone: NEGATIVE  / Bili: Small / Urobili: 0.2 E.U./dL   Blood: x / Protein: Trace mg/dL / Nitrite: NEGATIVE   Leuk Esterase: Trace / RBC: < 5 /HPF / WBC < 5 /HPF   Sq Epi: x / Non Sq Epi: Moderate /HPF / Bacteria: Present /HPF        RADIOLOGY & ADDITIONAL TESTS: Reviewed. INTERVAL HPI/OVERNIGHT EVENTS: No acute events overnight.    SUBJECTIVE: Patient seen and examined at bedside.    OBJECTIVE:    VITAL SIGNS:  ICU Vital Signs Last 24 Hrs  T(C): 36.4 (16 Mar 2020 08:59), Max: 38.5 (15 Mar 2020 11:55)  T(F): 97.6 (16 Mar 2020 08:59), Max: 101.3 (15 Mar 2020 11:55)  HR: 6 (16 Mar 2020 08:59) (6 - 94)  BP: 118/46 (16 Mar 2020 08:59) (106/52 - 141/97)  BP(mean): 67 (16 Mar 2020 08:59) (67 - 84)  ABP: --  ABP(mean): --  RR: 18 (16 Mar 2020 08:59) (16 - 18)  SpO2: 97% (16 Mar 2020 08:59) (93% - 98%)        03-15 @ 07:01  -  03-16 @ 07:00  --------------------------------------------------------  IN: 0 mL / OUT: 400 mL / NET: -400 mL      CAPILLARY BLOOD GLUCOSE          PHYSICAL EXAM:  Constitutional: WDWN, lying comfortably in bed, NAD  Head: Nc/At  Eyes: PERRL, EOMI, clear conjunctiva  ENT: no nasal discharge; MMM  Neck: supple; no JVD  Respiratory: CTA b/l, no wheezes, no crackles. Decrease breath sounds on right compared to left posterior lung fields.  Cardiac: +S1/S2, +RRR, no murmurs, rubs, or gallops  Gastrointestinal: obese, soft, nontender, nondistended, normoactive bowel sounds x4  Extremities: WWP, no clubbing or cyanosis, no peripheral edema  Vascular: 2+ radial and DP pulses b/l  Dermatologic: skin warm, dry and intact, no rashes, wounds  Neurologic: AAOx3, CNII-XII grossly intact, no focal deficits, moderate handgrip, 3/5 b/l LE strength, 4/5 b/l UE strength, b/l toe deformities 2/2 RA; bedbound       MEDICATIONS:  MEDICATIONS  (STANDING):  aspirin enteric coated 81 milliGRAM(s) Oral daily  azithromycin   Tablet 500 milliGRAM(s) Oral every 24 hours  cefTRIAXone   IVPB 1000 milliGRAM(s) IV Intermittent every 24 hours  citalopram 40 milliGRAM(s) Oral daily  enoxaparin Injectable 40 milliGRAM(s) SubCutaneous every 24 hours  hydroxychloroquine 200 milliGRAM(s) Oral every 12 hours  levothyroxine 25 MICROGram(s) Oral daily  pantoprazole    Tablet 20 milliGRAM(s) Oral before breakfast  predniSONE   Tablet 5 milliGRAM(s) Oral daily  pregabalin 100 milliGRAM(s) Oral three times a day  propranolol LA 80 milliGRAM(s) Oral daily  sulfaSALAzine 500 milliGRAM(s) Oral every 8 hours    MEDICATIONS  (PRN):  acetaminophen   Tablet .. 650 milliGRAM(s) Oral every 6 hours PRN Temp greater or equal to 38C (100.4F), Mild Pain (1 - 3)      ALLERGIES:  Allergies    clindamycin (Unknown)  piperacillin (Unknown)  vancomycin (Unknown)    Intolerances        LABS:                        11.1   3.09  )-----------( 196      ( 16 Mar 2020 05:46 )             36.7     03-16    137  |  102  |  10  ----------------------------<  94  3.8   |  23  |  0.68    Ca    8.3<L>      16 Mar 2020 05:47  Mg     1.8     03-16    TPro  8.2  /  Alb  3.5  /  TBili  0.3  /  DBili  x   /  AST  112<H>  /  ALT  97<H>  /  AlkPhos  74  03-15    PT/INR - ( 15 Mar 2020 10:41 )   PT: 13.1 sec;   INR: 1.14          PTT - ( 15 Mar 2020 10:41 )  PTT:31.9 sec  Urinalysis Basic - ( 15 Mar 2020 11:43 )    Color: Yellow / Appearance: Clear / SG: >=1.030 / pH: x  Gluc: x / Ketone: NEGATIVE  / Bili: Small / Urobili: 0.2 E.U./dL   Blood: x / Protein: Trace mg/dL / Nitrite: NEGATIVE   Leuk Esterase: Trace / RBC: < 5 /HPF / WBC < 5 /HPF   Sq Epi: x / Non Sq Epi: Moderate /HPF / Bacteria: Present /HPF        RADIOLOGY & ADDITIONAL TESTS: Reviewed. INTERVAL HPI/OVERNIGHT EVENTS: No acute events overnight.    SUBJECTIVE: Patient seen and examined at bedside. Denies acute complaints. Good appetite. Feels better today than yesterday. Denies fever, headache, nausea, vomiting, chest pain, shortness of breath, abdominal pain, changes in bowel movements or urination.     OBJECTIVE:    VITAL SIGNS:  ICU Vital Signs Last 24 Hrs  T(C): 36.4 (16 Mar 2020 08:59), Max: 38.5 (15 Mar 2020 11:55)  T(F): 97.6 (16 Mar 2020 08:59), Max: 101.3 (15 Mar 2020 11:55)  HR: 6 (16 Mar 2020 08:59) (6 - 94)  BP: 118/46 (16 Mar 2020 08:59) (106/52 - 141/97)  BP(mean): 67 (16 Mar 2020 08:59) (67 - 84)  ABP: --  ABP(mean): --  RR: 18 (16 Mar 2020 08:59) (16 - 18)  SpO2: 97% (16 Mar 2020 08:59) (93% - 98%)        03-15 @ 07:01  -  03-16 @ 07:00  --------------------------------------------------------  IN: 0 mL / OUT: 400 mL / NET: -400 mL      CAPILLARY BLOOD GLUCOSE          PHYSICAL EXAM:  Constitutional: WDWN, lying comfortably in bed, NAD  Head: Nc/At  Eyes: PERRL, EOMI, clear conjunctiva  ENT: no nasal discharge; MMM  Neck: supple; no JVD  Respiratory: CTA b/l, no wheezes, no crackles. Decrease breath sounds on right compared to left posterior lung fields.  Cardiac: +S1/S2, +RRR, no murmurs, rubs, or gallops  Gastrointestinal: obese, soft, nontender, nondistended, normoactive bowel sounds x4  Extremities: WWP, no clubbing or cyanosis, no peripheral edema  Vascular: 2+ radial and DP pulses b/l  Dermatologic: skin warm, dry and intact, no rashes, wounds  Neurologic: AAOx3, CNII-XII grossly intact, no focal deficits, moderate handgrip, 3/5 b/l LE strength, 4/5 b/l UE strength, b/l toe deformities 2/2 RA; bedbound       MEDICATIONS:  MEDICATIONS  (STANDING):  aspirin enteric coated 81 milliGRAM(s) Oral daily  azithromycin   Tablet 500 milliGRAM(s) Oral every 24 hours  cefTRIAXone   IVPB 1000 milliGRAM(s) IV Intermittent every 24 hours  citalopram 40 milliGRAM(s) Oral daily  enoxaparin Injectable 40 milliGRAM(s) SubCutaneous every 24 hours  hydroxychloroquine 200 milliGRAM(s) Oral every 12 hours  levothyroxine 25 MICROGram(s) Oral daily  pantoprazole    Tablet 20 milliGRAM(s) Oral before breakfast  predniSONE   Tablet 5 milliGRAM(s) Oral daily  pregabalin 100 milliGRAM(s) Oral three times a day  propranolol LA 80 milliGRAM(s) Oral daily  sulfaSALAzine 500 milliGRAM(s) Oral every 8 hours    MEDICATIONS  (PRN):  acetaminophen   Tablet .. 650 milliGRAM(s) Oral every 6 hours PRN Temp greater or equal to 38C (100.4F), Mild Pain (1 - 3)      ALLERGIES:  Allergies    clindamycin (Unknown)  piperacillin (Unknown)  vancomycin (Unknown)    Intolerances        LABS:                        11.1   3.09  )-----------( 196      ( 16 Mar 2020 05:46 )             36.7     03-16    137  |  102  |  10  ----------------------------<  94  3.8   |  23  |  0.68    Ca    8.3<L>      16 Mar 2020 05:47  Mg     1.8     03-16    TPro  8.2  /  Alb  3.5  /  TBili  0.3  /  DBili  x   /  AST  112<H>  /  ALT  97<H>  /  AlkPhos  74  03-15    PT/INR - ( 15 Mar 2020 10:41 )   PT: 13.1 sec;   INR: 1.14          PTT - ( 15 Mar 2020 10:41 )  PTT:31.9 sec  Urinalysis Basic - ( 15 Mar 2020 11:43 )    Color: Yellow / Appearance: Clear / SG: >=1.030 / pH: x  Gluc: x / Ketone: NEGATIVE  / Bili: Small / Urobili: 0.2 E.U./dL   Blood: x / Protein: Trace mg/dL / Nitrite: NEGATIVE   Leuk Esterase: Trace / RBC: < 5 /HPF / WBC < 5 /HPF   Sq Epi: x / Non Sq Epi: Moderate /HPF / Bacteria: Present /HPF        RADIOLOGY & ADDITIONAL TESTS: Reviewed.

## 2020-03-16 NOTE — PROGRESS NOTE ADULT - SUBJECTIVE AND OBJECTIVE BOX
D IM FOR DR PADILLA    64F with rheumatoid arthritis, fibromyalgia, presents from [facility] complaining of a 4-5 day of subjective fevers and a dry cough (resolved approximately 2 days ago). Patient expressed concern for COVID-19 given that numerous residents at the facility were in contact with a COVID-19 positive patient. Patient had not been directly in contact with any COVID-19 positive patient, and her roommate is neither positive nor symptomatic. Patient during time of evaluation in the ED felt "fine, I am only concerned whether or not I have the virus, and why I am sweating so much. I feel very hot." Patent denies chills, nausea, vomiting, changes in bowel movement (had 1 bowel movement during encounter). Patient otherwise feels close to her baseline, when asked scale 1-10, 10 being 100% normal, patient stated 8-9.  She is in Covid-19 isolation and all protective equipment was used.    ED vitals: T 101.3, HR 73, /72, RR 18, 96%  ED labs: Na 113, , ALT 97, U/A dirty (negative), RVP negative  ED studies: CXR w/ elevated R hemidiaphragm, b/l increased interstitial markings  ED course: 1L NS bolus, Ceftriaxone 1g at 11 AM     Review of Systems:  Other Review of Systems: All other review of systems negative, except as noted in HPI	      Allergies and Intolerances:        Allergies:  	clindamycin: Drug, Unknown  	piperacillin: Drug, Unknown  	vancomycin: Drug, Unknown    Home Medications:   * Incomplete Medication History as of 15-Mar-2020 11:50 documented in Structured Notes  · 	aspirin 81 mg oral tablet: Last Dose Taken:  , 1 tab(s) orally once a day  · 	citalopram 40 mg oral tablet: Last Dose Taken:  , 1 tab(s) orally once a day  · 	Fosamax 70 mg oral tablet: Last Dose Taken:  , 1 tab(s) orally once a week  · 	hydroxychloroquine 200 mg oral tablet: Last Dose Taken:  , orally 2 times a day  · 	magnesium oxide 400 mg (240 mg elemental magnesium) oral tablet: Last Dose Taken:  , 1 tab(s) orally once a day  · 	Percocet 5/325: Last Dose Taken:    · 	pantoprazole 20 mg oral delayed release tablet: Last Dose Taken:    · 	predniSONE 5 mg oral tablet: 1 tab(s) orally once a day  · 	pregabalin 100 mg oral capsule:   · 	PropranoloL Hydrochloride ER:   · 	sulfaSALAzine 500 mg oral tablet:   · 	Synthroid:     .    Patient History:    Past Medical, Past Surgical, and Family History:  PAST MEDICAL HISTORY:  Depression     Fibromyalgia     Hypothyroid     Osteoporosis     RA (rheumatoid arthritis).     Social History:  Social History (marital status, living situation, occupation, tobacco use, alcohol and drug use, and sexual history): Never smoker, drinker, denies illicit drug use.	     Tobacco Screening:  · Core Measure Site	No	  · Has the patient used tobacco in the past 30 days?	No	    Risk Assessment:    Present on Admission:  Deep Venous Thrombosis	no	  Pulmonary Embolus	no	     Heart Failure:  Does this patient have a history of or has been diagnosed with heart failure? no.    HIV Screen (per [facility] Department of Health, HIV screening must be offered to every individual between ages 13 and 64)	Offered and patient declined	       Physical Exam:  Physical Exam: VITAL SIGNS:  T(C): 36.9 (03-15-20 @ 15:45), Max: 38.5 (03-15-20 @ 11:55)  T(F): 98.4 (03-15-20 @ 15:45), Max: 101.3 (03-15-20 @ 11:55)  HR: 64 (03-15-20 @ 15:45) (64 - 73)  BP: 116/59 (03-15-20 @ 15:45) (106/52 - 116/59)  BP(mean): --  RR: 18 (03-15-20 @ 15:45) (18 - 18)  SpO2: 98% (03-15-20 @ 15:45) (96% - 98%)  Wt(kg): --   PHYSICAL EXAM:   Constitutional: WDWN, lying comfortably in bed, NAD  Head: Nc/At  Eyes: PERRL, EOMI, clear conjunctiva  ENT: no nasal discharge; MMM  Neck: supple; no JVD  Respiratory: CTA b/l, no wheezes, no crackles  Cardiac: +S1/S2, +RRR, no murmurs, rubs, or gallops  Gastrointestinal: obese, soft, nontender, nondistended, normoactive bowel sounds x4  Extremities: WWP, no clubbing or cyanosis, no peripheral edema  Vascular: 2+ radial and DP pulses b/l  Dermatologic: skin warm, dry and intact, no rashes, wounds Neurologic: AAOx3, CNII-XII grossly intact, no focal deficits, moderate handgrip, 3/5 b/l LE strength, 4/5 b/l UE strength, b/l toe deformities 2/2 RA; bedbound	       Labs and Results:  Labs, Radiology, Cardiology, and Other Results: LABS:                         12.1   4.18  )-----------( 227      ( 15 Mar 2020 10:41 )             39.3    03-15   133<L>  |  97  |  11  ----------------------------<  92  4.6   |  25  |  0.77   Ca    8.8      15 Mar 2020 10:41   TPro  8.2  /  Alb  3.5  /  TBili  0.3  /  DBili  x   /  AST  112<H>  /  ALT  97<H>  /  AlkPhos  74  03-15   PT/INR - ( 15 Mar 2020 10:41 )   PT: 13.1 sec;   INR: 1.14         PTT - ( 15 Mar 2020 10:41 )  PTT:31.9 sec  Urinalysis Basic - ( 15 Mar 2020 11:43 )   Color: Yellow / Appearance: Clear / SG: >=1.030 / pH: x  Gluc: x / Ketone: NEGATIVE  / Bili: Small / Urobili: 0.2 E.U./dL   Blood: x / Protein: Trace mg/dL / Nitrite: NEGATIVE   Leuk Esterase: Trace / RBC: < 5 /HPF / WBC < 5 /HPF   Sq Epi: x / Non Sq Epi: Moderate /HPF / Bacteria: Present /HPF   Lactate, Blood: 1.3 mmol/L (03-15 @ 10:41)  CXR reported with opacity and atelectasis I see right hemidiaphragmatic elevation and rotation.	    · Assessment		  64F with rheumatoid arthritis, fibromyalgia, presents from [facility] complaining of a 4-5 day of subjective fevers and a dry cough (resolved approximately 2 days ago). Patient is otherwise feeling well, only complaining of sweating. Admitted for COVID r/o and treatment of CAP     Problem/Plan - 1:  ·  Problem: R/O Coronavirus infection.  Plan: 5 day hx of subjective fever, dry cough, with elevated CRP and LDH (pending ferritin), no lymphopenia, concern for COVID-19 [facility]  - f/u COVID-19 PCR  - C/w ceftriaxone 1g daily and azithromycin 500 for 3 days for CAP treatment for now  - f/u urine legionella.      Problem/Plan - 2:  ·  Problem: Fibromyalgia.  Plan: Hx of fibromyalgia, was taking percocet in the past for pain control  - C/w tylenol for pain control  - C/w pregablin 100 mg TID  - C/w propanolol 80 mg (for migraine).      Problem/Plan - 3:  ·  Problem: RA (rheumatoid arthritis).  Plan: Hx of RA, w/ b/l toe deformities, sig joint pain, patient is bedbound  - C/w prednisone 5 mg daily  - C/w sulfasalazine 500 mg TID  - C/w Plaquenil 200 mg BID  - C/w protonix 20 mg daily.      Problem/Plan - 4:  ·  Problem: Osteoporosis.  Plan: On fosamax, not on formulary  - Will need patient to bring in own supply.      Problem/Plan - 5:  ·  Problem: Hypothyroid.  Plan: Hx of hypothyroidism  - C/w home synthroid 25 mg  - F/u TSH.      Problem/Plan - 6:  Problem: Depression. Plan: hx of depression  - C/w home citalopram 40 mg.     Problem/Plan - 7:  ·  Problem: Nutrition, metabolism, and development symptoms.  Plan: F: no IVF  E: K>4 Mg>2, monitor and replete as needed  N: DASH diet.      Problem/Plan - 8:  ·  Problem: Prophylactic measure.  Plan: Ppx: protonix and lovenox  DNR/DNI.      Problem/Plan - 9:  ·  Problem: Transition of care performed with sharing of clinical summary.  Plan: 1) PCP Contacted on Admission: (Y/N) --> Name & Phone #: Dr. Padilla, but Dr. Cordero covering  2) Date of Contact with PCP: 3/15/2020  3) PCP Contacted at Discharge: (Y/N, N/A)  4) Summary of Handoff Given to PCP:   5) Post-Discharge Appointment Date and Location:        Attending Statement: possibly pneumonia. CEF/MARQUEZ are useful. F/u Covid testing. Fever, no cough.  Liver function noted. leucopenia noted.    Admitted to r/o covid 19  supportive rx    Being followed by Dr Cordero/Alisha .

## 2020-03-16 NOTE — PROGRESS NOTE ADULT - PROBLEM SELECTOR PLAN 2
Hx of fibromyalgia, was taking percocet in the past for pain control  - C/w tylenol for pain control  - C/w pregablin 100 mg TID  - C/w propanolol 80 mg (for migraine)

## 2020-03-17 DIAGNOSIS — B34.2 CORONAVIRUS INFECTION, UNSPECIFIED: ICD-10-CM

## 2020-03-17 LAB
ALBUMIN SERPL ELPH-MCNC: 3.3 G/DL — SIGNIFICANT CHANGE UP (ref 3.3–5)
ALP SERPL-CCNC: 66 U/L — SIGNIFICANT CHANGE UP (ref 40–120)
ALT FLD-CCNC: 74 U/L — HIGH (ref 10–45)
ANION GAP SERPL CALC-SCNC: 10 MMOL/L — SIGNIFICANT CHANGE UP (ref 5–17)
AST SERPL-CCNC: 84 U/L — HIGH (ref 10–40)
BILIRUB SERPL-MCNC: 0.2 MG/DL — SIGNIFICANT CHANGE UP (ref 0.2–1.2)
BUN SERPL-MCNC: 11 MG/DL — SIGNIFICANT CHANGE UP (ref 7–23)
CALCIUM SERPL-MCNC: 8.1 MG/DL — LOW (ref 8.4–10.5)
CHLORIDE SERPL-SCNC: 103 MMOL/L — SIGNIFICANT CHANGE UP (ref 96–108)
CO2 SERPL-SCNC: 22 MMOL/L — SIGNIFICANT CHANGE UP (ref 22–31)
CREAT SERPL-MCNC: 0.65 MG/DL — SIGNIFICANT CHANGE UP (ref 0.5–1.3)
GLUCOSE SERPL-MCNC: 100 MG/DL — HIGH (ref 70–99)
HCT VFR BLD CALC: 36.4 % — SIGNIFICANT CHANGE UP (ref 34.5–45)
HGB BLD-MCNC: 11.1 G/DL — LOW (ref 11.5–15.5)
MAGNESIUM SERPL-MCNC: 2 MG/DL — SIGNIFICANT CHANGE UP (ref 1.6–2.6)
MCHC RBC-ENTMCNC: 25.6 PG — LOW (ref 27–34)
MCHC RBC-ENTMCNC: 30.5 GM/DL — LOW (ref 32–36)
MCV RBC AUTO: 83.9 FL — SIGNIFICANT CHANGE UP (ref 80–100)
NRBC # BLD: 0 /100 WBCS — SIGNIFICANT CHANGE UP (ref 0–0)
PHOSPHATE SERPL-MCNC: 2.8 MG/DL — SIGNIFICANT CHANGE UP (ref 2.5–4.5)
PLATELET # BLD AUTO: 198 K/UL — SIGNIFICANT CHANGE UP (ref 150–400)
POTASSIUM SERPL-MCNC: 3.9 MMOL/L — SIGNIFICANT CHANGE UP (ref 3.5–5.3)
POTASSIUM SERPL-SCNC: 3.9 MMOL/L — SIGNIFICANT CHANGE UP (ref 3.5–5.3)
PROT SERPL-MCNC: 7.4 G/DL — SIGNIFICANT CHANGE UP (ref 6–8.3)
RBC # BLD: 4.34 M/UL — SIGNIFICANT CHANGE UP (ref 3.8–5.2)
RBC # FLD: 17.3 % — HIGH (ref 10.3–14.5)
SARS-COV-2 RNA SPEC QL NAA+PROBE: DETECTED
SODIUM SERPL-SCNC: 135 MMOL/L — SIGNIFICANT CHANGE UP (ref 135–145)
WBC # BLD: 3.53 K/UL — LOW (ref 3.8–10.5)
WBC # FLD AUTO: 3.53 K/UL — LOW (ref 3.8–10.5)

## 2020-03-17 RX ADMIN — Medication 100 MILLIGRAM(S): at 05:20

## 2020-03-17 RX ADMIN — AZITHROMYCIN 500 MILLIGRAM(S): 500 TABLET, FILM COATED ORAL at 17:35

## 2020-03-17 RX ADMIN — Medication 25 MICROGRAM(S): at 05:20

## 2020-03-17 RX ADMIN — Medication 500 MILLIGRAM(S): at 14:25

## 2020-03-17 RX ADMIN — Medication 500 MILLIGRAM(S): at 21:42

## 2020-03-17 RX ADMIN — Medication 650 MILLIGRAM(S): at 22:11

## 2020-03-17 RX ADMIN — Medication 200 MILLIGRAM(S): at 17:35

## 2020-03-17 RX ADMIN — Medication 5 MILLIGRAM(S): at 05:20

## 2020-03-17 RX ADMIN — Medication 80 MILLIGRAM(S): at 05:22

## 2020-03-17 RX ADMIN — PANTOPRAZOLE SODIUM 20 MILLIGRAM(S): 20 TABLET, DELAYED RELEASE ORAL at 05:20

## 2020-03-17 RX ADMIN — Medication 650 MILLIGRAM(S): at 05:20

## 2020-03-17 RX ADMIN — CEFTRIAXONE 100 MILLIGRAM(S): 500 INJECTION, POWDER, FOR SOLUTION INTRAMUSCULAR; INTRAVENOUS at 10:32

## 2020-03-17 RX ADMIN — Medication 100 MILLIGRAM(S): at 21:42

## 2020-03-17 RX ADMIN — ENOXAPARIN SODIUM 40 MILLIGRAM(S): 100 INJECTION SUBCUTANEOUS at 14:25

## 2020-03-17 RX ADMIN — Medication 100 MILLIGRAM(S): at 14:34

## 2020-03-17 RX ADMIN — Medication 200 MILLIGRAM(S): at 05:20

## 2020-03-17 RX ADMIN — Medication 500 MILLIGRAM(S): at 05:20

## 2020-03-17 RX ADMIN — CITALOPRAM 40 MILLIGRAM(S): 10 TABLET, FILM COATED ORAL at 14:34

## 2020-03-17 RX ADMIN — Medication 81 MILLIGRAM(S): at 14:24

## 2020-03-17 RX ADMIN — Medication 650 MILLIGRAM(S): at 23:11

## 2020-03-17 NOTE — PROGRESS NOTE ADULT - PROBLEM SELECTOR PLAN 1
5 day hx of subjective fever, dry cough, with elevated CRP and LDH (pending ferritin), no lymphopenia, concern for COVID-19 [facility]  - f/u COVID-19 PCR  - C/w ceftriaxone 1g daily and azithromycin 500 for 3 days for CAP treatment for now  - f/u urine legionella 5 day hx of subjective fever, dry cough, with elevated CRP and LDH (pending ferritin), no lymphopenia, concern for COVID-19 given high risk exposure site of [facility]  - COVID-19 PCR positive, c/w supportive treatment   - C/w ceftriaxone 1g daily and azithromycin 500 for 3 days for CAP treatment for now

## 2020-03-17 NOTE — PROGRESS NOTE ADULT - SUBJECTIVE AND OBJECTIVE BOX
INCOMPLETE    INTERVAL HPI/OVERNIGHT EVENTS: No acute events overnight.    SUBJECTIVE: Patient seen and examined at bedside. Denies acute complaints. Good appetite. Feels better today than yesterday. Denies fever, headache, nausea, vomiting, chest pain, shortness of breath, abdominal pain, changes in bowel movements or urination.     OBJECTIVE:    VITAL SIGNS:  ICU Vital Signs Last 24 Hrs  T(C): 36.4 (16 Mar 2020 08:59), Max: 38.5 (15 Mar 2020 11:55)  T(F): 97.6 (16 Mar 2020 08:59), Max: 101.3 (15 Mar 2020 11:55)  HR: 6 (16 Mar 2020 08:59) (6 - 94)  BP: 118/46 (16 Mar 2020 08:59) (106/52 - 141/97)  BP(mean): 67 (16 Mar 2020 08:59) (67 - 84)  ABP: --  ABP(mean): --  RR: 18 (16 Mar 2020 08:59) (16 - 18)  SpO2: 97% (16 Mar 2020 08:59) (93% - 98%)        03-15 @ 07:01  -  03-16 @ 07:00  --------------------------------------------------------  IN: 0 mL / OUT: 400 mL / NET: -400 mL      CAPILLARY BLOOD GLUCOSE      PHYSICAL EXAM:  Constitutional: WDWN, lying comfortably in bed, NAD  Head: Nc/At  Eyes: PERRL, EOMI, clear conjunctiva  ENT: no nasal discharge; MMM  Neck: supple; no JVD  Respiratory: CTA b/l, no wheezes, no crackles. Decrease breath sounds on right compared to left posterior lung fields.  Cardiac: +S1/S2, +RRR, no murmurs, rubs, or gallops  Gastrointestinal: obese, soft, nontender, nondistended, normoactive bowel sounds x4  Extremities: WWP, no clubbing or cyanosis, no peripheral edema  Vascular: 2+ radial and DP pulses b/l  Dermatologic: skin warm, dry and intact, no rashes, wounds  Neurologic: AAOx3, CNII-XII grossly intact, no focal deficits, moderate handgrip, 3/5 b/l LE strength, 4/5 b/l UE strength, b/l toe deformities 2/2 RA; bedbound       MEDICATIONS:  MEDICATIONS  (STANDING):  aspirin enteric coated 81 milliGRAM(s) Oral daily  azithromycin   Tablet 500 milliGRAM(s) Oral every 24 hours  cefTRIAXone   IVPB 1000 milliGRAM(s) IV Intermittent every 24 hours  citalopram 40 milliGRAM(s) Oral daily  enoxaparin Injectable 40 milliGRAM(s) SubCutaneous every 24 hours  hydroxychloroquine 200 milliGRAM(s) Oral every 12 hours  levothyroxine 25 MICROGram(s) Oral daily  pantoprazole    Tablet 20 milliGRAM(s) Oral before breakfast  predniSONE   Tablet 5 milliGRAM(s) Oral daily  pregabalin 100 milliGRAM(s) Oral three times a day  propranolol LA 80 milliGRAM(s) Oral daily  sulfaSALAzine 500 milliGRAM(s) Oral every 8 hours    MEDICATIONS  (PRN):  acetaminophen   Tablet .. 650 milliGRAM(s) Oral every 6 hours PRN Temp greater or equal to 38C (100.4F), Mild Pain (1 - 3)      ALLERGIES:  Allergies    clindamycin (Unknown)  piperacillin (Unknown)  vancomycin (Unknown)    Intolerances        LABS:                        11.1   3.09  )-----------( 196      ( 16 Mar 2020 05:46 )             36.7     03-16    137  |  102  |  10  ----------------------------<  94  3.8   |  23  |  0.68    Ca    8.3<L>      16 Mar 2020 05:47  Mg     1.8     03-16    TPro  8.2  /  Alb  3.5  /  TBili  0.3  /  DBili  x   /  AST  112<H>  /  ALT  97<H>  /  AlkPhos  74  03-15    PT/INR - ( 15 Mar 2020 10:41 )   PT: 13.1 sec;   INR: 1.14          PTT - ( 15 Mar 2020 10:41 )  PTT:31.9 sec  Urinalysis Basic - ( 15 Mar 2020 11:43 )    Color: Yellow / Appearance: Clear / SG: >=1.030 / pH: x  Gluc: x / Ketone: NEGATIVE  / Bili: Small / Urobili: 0.2 E.U./dL   Blood: x / Protein: Trace mg/dL / Nitrite: NEGATIVE   Leuk Esterase: Trace / RBC: < 5 /HPF / WBC < 5 /HPF   Sq Epi: x / Non Sq Epi: Moderate /HPF / Bacteria: Present /HPF        RADIOLOGY & ADDITIONAL TESTS: Reviewed. INTERVAL HPI/OVERNIGHT EVENTS: No acute events overnight.    SUBJECTIVE: Patient seen and examined at bedside. Denies acute complaints. Denies fever, headache, nausea, vomiting, chest pain, shortness of breath, abdominal pain, changes in bowel movements or urination.     OBJECTIVE:    Vital Signs Last 24 Hrs  T(C): 36.6 (17 Mar 2020 12:06), Max: 38.3 (17 Mar 2020 04:35)  T(F): 97.8 (17 Mar 2020 12:06), Max: 101 (17 Mar 2020 04:35)  HR: 65 (17 Mar 2020 12:06) (65 - 78)  BP: 115/58 (17 Mar 2020 12:06) (104/53 - 142/63)  BP(mean): 76 (17 Mar 2020 08:36) (76 - 91)  RR: 16 (17 Mar 2020 12:06) (13 - 17)  SpO2: 93% (17 Mar 2020 12:06) (92% - 96%)      PHYSICAL EXAM:  Constitutional: WDWN, lying comfortably in bed, NAD  Head: Nc/At  Eyes: PERRL, EOMI, clear conjunctiva  ENT: no nasal discharge; MMM  Neck: supple; no JVD  Respiratory: CTA b/l, no wheezes, no crackles. Decrease breath sounds on right compared to left posterior lung fields.  Cardiac: +S1/S2, +RRR, no murmurs, rubs, or gallops  Gastrointestinal: obese, soft, nontender, nondistended, normoactive bowel sounds x4  Extremities: WWP, no clubbing or cyanosis, no peripheral edema  Vascular: 2+ radial and DP pulses b/l  Dermatologic: skin warm, dry and intact, no rashes, wounds  Neurologic: AAOx3, CNII-XII grossly intact, no focal deficits, moderate handgrip, 3/5 b/l LE strength, 4/5 b/l UE strength, b/l toe deformities 2/2 RA; bedbound     MEDICATIONS  (STANDING):  aspirin enteric coated 81 milliGRAM(s) Oral daily  cefTRIAXone   IVPB 1000 milliGRAM(s) IV Intermittent every 24 hours  citalopram 40 milliGRAM(s) Oral daily  enoxaparin Injectable 40 milliGRAM(s) SubCutaneous every 24 hours  hydroxychloroquine 200 milliGRAM(s) Oral every 12 hours  levothyroxine 25 MICROGram(s) Oral daily  pantoprazole    Tablet 20 milliGRAM(s) Oral before breakfast  predniSONE   Tablet 5 milliGRAM(s) Oral daily  pregabalin 100 milliGRAM(s) Oral three times a day  propranolol LA 80 milliGRAM(s) Oral daily  sulfaSALAzine 500 milliGRAM(s) Oral every 8 hours    MEDICATIONS  (PRN):  acetaminophen   Tablet .. 650 milliGRAM(s) Oral every 6 hours PRN Temp greater or equal to 38C (100.4F), Mild Pain (1 - 3)    ALLERGIES:  Allergies    clindamycin (Unknown)  piperacillin (Unknown)  vancomycin (Unknown)    Intolerances    .  LABS:                         11.1   3.53  )-----------( 198      ( 17 Mar 2020 05:50 )             36.4     03-17    135  |  103  |  11  ----------------------------<  100<H>  3.9   |  22  |  0.65    Ca    8.1<L>      17 Mar 2020 05:51  Phos  2.8     03-17  Mg     2.0     03-17    TPro  7.4  /  Alb  3.3  /  TBili  0.2  /  DBili  x   /  AST  84<H>  /  ALT  74<H>  /  AlkPhos  66  03-17      RADIOLOGY, EKG & ADDITIONAL TESTS: Reviewed.

## 2020-03-17 NOTE — PROGRESS NOTE ADULT - SUBJECTIVE AND OBJECTIVE BOX
D  FOR DR PADILLA    64F with rheumatoid arthritis, fibromyalgia, presents from [facility] complaining of a 4-5 day of subjective fevers and a dry cough (resolved approximately 2 days ago). Patient expressed concern for COVID-19 given that numerous residents at the facility were in contact with a COVID-19 positive patient. Patient had not been directly in contact with any COVID-19 positive patient, and her roommate is neither positive nor symptomatic. Patient during time of evaluation in the ED felt "fine, I am only concerned whether or not I have the virus, and why I am sweating so much. I feel very hot." Patent denies chills, nausea, vomiting, changes in bowel movement (had 1 bowel movement during encounter). Patient otherwise feels close to her baseline, when asked scale 1-10, 10 being 100% normal, patient stated 8-9.  She is in Covid-19 isolation and all protective equipment was used.    All new data reviewed, including VS, lab, imaging, Rx and documentation. Covid-19 POSITIVE NOW.    ED vitals: T 101.3, HR 73, /72, RR 18, 96%  ED labs: Na 113, , ALT 97, U/A dirty (negative), RVP negative  ED studies: CXR w/ elevated R hemidiaphragm, b/l increased interstitial markings  ED course: 1L NS bolus, Ceftriaxone 1g at 11 AM     Review of Systems:  Other Review of Systems: All other review of systems negative, except as noted in HPI	      Allergies and Intolerances:        Allergies:  	clindamycin: Drug, Unknown  	piperacillin: Drug, Unknown  	vancomycin: Drug, Unknown    Home Medications:   * Incomplete Medication History as of 15-Mar-2020 11:50 documented in Structured Notes  · 	aspirin 81 mg oral tablet: Last Dose Taken:  , 1 tab(s) orally once a day  · 	citalopram 40 mg oral tablet: Last Dose Taken:  , 1 tab(s) orally once a day  · 	Fosamax 70 mg oral tablet: Last Dose Taken:  , 1 tab(s) orally once a week  · 	hydroxychloroquine 200 mg oral tablet: Last Dose Taken:  , orally 2 times a day  · 	magnesium oxide 400 mg (240 mg elemental magnesium) oral tablet: Last Dose Taken:  , 1 tab(s) orally once a day  · 	Percocet 5/325: Last Dose Taken:    · 	pantoprazole 20 mg oral delayed release tablet: Last Dose Taken:    · 	predniSONE 5 mg oral tablet: 1 tab(s) orally once a day  · 	pregabalin 100 mg oral capsule:   · 	PropranoloL Hydrochloride ER:   · 	sulfaSALAzine 500 mg oral tablet:   · 	Synthroid:     .    Patient History:    Past Medical, Past Surgical, and Family History:  PAST MEDICAL HISTORY:  Depression     Fibromyalgia     Hypothyroid     Osteoporosis     RA (rheumatoid arthritis).     Social History:  Social History (marital status, living situation, occupation, tobacco use, alcohol and drug use, and sexual history): Never smoker, drinker, denies illicit drug use.	     Tobacco Screening:  · Core Measure Site	No	  · Has the patient used tobacco in the past 30 days?	No	    Risk Assessment:    Present on Admission:  Deep Venous Thrombosis	no	  Pulmonary Embolus	no	     Heart Failure:  Does this patient have a history of or has been diagnosed with heart failure? no.    HIV Screen (per [facility] Department of Health, HIV screening must be offered to every individual between ages 13 and 64)	Offered and patient declined	       Physical Exam:  Physical Exam: VITAL SIGNS:  T(C): 36.9 (03-15-20 @ 15:45), Max: 38.5 (03-15-20 @ 11:55)  T(F): 98.4 (03-15-20 @ 15:45), Max: 101.3 (03-15-20 @ 11:55)  HR: 64 (03-15-20 @ 15:45) (64 - 73)  BP: 116/59 (03-15-20 @ 15:45) (106/52 - 116/59)  BP(mean): --  RR: 18 (03-15-20 @ 15:45) (18 - 18)  SpO2: 98% (03-15-20 @ 15:45) (96% - 98%)  Wt(kg): --   PHYSICAL EXAM:   Constitutional: WDWN, lying comfortably in bed, NAD  Head: Nc/At  Eyes: PERRL, EOMI, clear conjunctiva  ENT: no nasal discharge; MMM  Neck: supple; no JVD  Respiratory: CTA b/l, no wheezes, no crackles  Cardiac: +S1/S2, +RRR, no murmurs, rubs, or gallops  Gastrointestinal: obese, soft, nontender, nondistended, normoactive bowel sounds x4  Extremities: WWP, no clubbing or cyanosis, no peripheral edema  Vascular: 2+ radial and DP pulses b/l  Dermatologic: skin warm, dry and intact, no rashes, wounds Neurologic: AAOx3, CNII-XII grossly intact, no focal deficits, moderate handgrip, 3/5 b/l LE strength, 4/5 b/l UE strength, b/l toe deformities 2/2 RA; bedbound	       Labs and Results:  Labs, Radiology, Cardiology, and Other Results: LABS:                         12.1   4.18  )-----------( 227      ( 15 Mar 2020 10:41 )             39.3    03-15   133<L>  |  97  |  11  ----------------------------<  92  4.6   |  25  |  0.77   Ca    8.8      15 Mar 2020 10:41   TPro  8.2  /  Alb  3.5  /  TBili  0.3  /  DBili  x   /  AST  112<H>  /  ALT  97<H>  /  AlkPhos  74  03-15   PT/INR - ( 15 Mar 2020 10:41 )   PT: 13.1 sec;   INR: 1.14         PTT - ( 15 Mar 2020 10:41 )  PTT:31.9 sec  Urinalysis Basic - ( 15 Mar 2020 11:43 )   Color: Yellow / Appearance: Clear / SG: >=1.030 / pH: x  Gluc: x / Ketone: NEGATIVE  / Bili: Small / Urobili: 0.2 E.U./dL   Blood: x / Protein: Trace mg/dL / Nitrite: NEGATIVE   Leuk Esterase: Trace / RBC: < 5 /HPF / WBC < 5 /HPF   Sq Epi: x / Non Sq Epi: Moderate /HPF / Bacteria: Present /HPF   Lactate, Blood: 1.3 mmol/L (03-15 @ 10:41)  CXR reported with opacity and atelectasis I see right hemidiaphragmatic elevation and rotation.	    · Assessment		  64F with rheumatoid arthritis, fibromyalgia, presents from [facility] complaining of a 4-5 day of subjective fevers and a dry cough (resolved approximately 2 days ago). Patient is otherwise feeling well, only complaining of sweating. Admitted for COVID r/o and treatment of CAP     Problem/Plan - 1:  ·  Problem: Coronavirus infection.  Plan: 5 day hx of subjective fever, dry cough, with elevated CRP and LDH (pending ferritin), no lymphopenia, [facility]  - COVID-19 positive  - C/w ceftriaxone 1g daily and azithromycin 500 for 3 days for CAP treatment for now  - f/u urine legionella.      Problem/Plan - 2:  ·  Problem: Fibromyalgia.  Plan: Hx of fibromyalgia, was taking percocet in the past for pain control  - C/w tylenol for pain control  - C/w pregablin 100 mg TID  - C/w propanolol 80 mg (for migraine).      Problem/Plan - 3:  ·  Problem: RA (rheumatoid arthritis).  Plan: Hx of RA, w/ b/l toe deformities, sig joint pain, patient is bedbound  - C/w prednisone 5 mg daily  - C/w sulfasalazine 500 mg TID  - C/w Plaquenil 200 mg BID  - C/w protonix 20 mg daily.      Problem/Plan - 4:  ·  Problem: Osteoporosis.  Plan: On fosamax, not on formulary  - Will need patient to bring in own supply.      Problem/Plan - 5:  ·  Problem: Hypothyroid.  Plan: Hx of hypothyroidism  - C/w home synthroid 25 mg  - F/u TSH.      Problem/Plan - 6:  Problem: Depression. Plan: hx of depression  - C/w home citalopram 40 mg.     Problem/Plan - 7:  ·  Problem: Nutrition, metabolism, and development symptoms.  Plan: F: no IVF  E: K>4 Mg>2, monitor and replete as needed  N: DASH diet.      Problem/Plan - 8:  ·  Problem: Prophylactic measure.  Plan: Ppx: protonix and lovenox  DNR/DNI.      Problem/Plan - 9:  ·  Problem: Transition of care performed with sharing of clinical summary.  Plan: 1) PCP Contacted on Admission: (Y/N) --> Name & Phone #: Dr. Padilla, but Dr. Cordero covering  2) Date of Contact with PCP: 3/15/2020  3) PCP Contacted at Discharge: (Y/N, N/A)  4) Summary of Handoff Given to PCP:   5) Post-Discharge Appointment Date and Location:        Attending Statement: possibly pneumonia. CEF/MARQUEZ are useful. F/u Covid testing. Fever, no cough.  Liver function noted. leucopenia noted.    Admitted to /o covid 19  supportive rx

## 2020-03-17 NOTE — PROGRESS NOTE ADULT - ASSESSMENT
64F with rheumatoid arthritis, fibromyalgia, presents from [facility] complaining of a 4-5 day of subjective fevers and a dry cough (resolved approximately 2 days ago). Patient is otherwise feeling well, only complaining of sweating. Admitted for COVID r/o and treatment of CAP 65yo F with rheumatoid arthritis, fibromyalgia, presents from [facility] complaining of a 4-5 day of subjective fevers and a dry cough (resolved approximately 2 days ago). Now COVID-19 positive.

## 2020-03-18 LAB
ALBUMIN SERPL ELPH-MCNC: 3.2 G/DL — LOW (ref 3.3–5)
ALP SERPL-CCNC: 62 U/L — SIGNIFICANT CHANGE UP (ref 40–120)
ALT FLD-CCNC: 62 U/L — HIGH (ref 10–45)
ANION GAP SERPL CALC-SCNC: 12 MMOL/L — SIGNIFICANT CHANGE UP (ref 5–17)
AST SERPL-CCNC: 73 U/L — HIGH (ref 10–40)
BASOPHILS # BLD AUTO: 0.01 K/UL — SIGNIFICANT CHANGE UP (ref 0–0.2)
BASOPHILS NFR BLD AUTO: 0.3 % — SIGNIFICANT CHANGE UP (ref 0–2)
BILIRUB SERPL-MCNC: 0.2 MG/DL — SIGNIFICANT CHANGE UP (ref 0.2–1.2)
BUN SERPL-MCNC: 11 MG/DL — SIGNIFICANT CHANGE UP (ref 7–23)
CALCIUM SERPL-MCNC: 8.2 MG/DL — LOW (ref 8.4–10.5)
CHLORIDE SERPL-SCNC: 104 MMOL/L — SIGNIFICANT CHANGE UP (ref 96–108)
CO2 SERPL-SCNC: 22 MMOL/L — SIGNIFICANT CHANGE UP (ref 22–31)
CREAT SERPL-MCNC: 0.65 MG/DL — SIGNIFICANT CHANGE UP (ref 0.5–1.3)
CRP SERPL-MCNC: 2.84 MG/DL — HIGH (ref 0–0.4)
EOSINOPHIL # BLD AUTO: 0.03 K/UL — SIGNIFICANT CHANGE UP (ref 0–0.5)
EOSINOPHIL NFR BLD AUTO: 0.9 % — SIGNIFICANT CHANGE UP (ref 0–6)
FERRITIN SERPL-MCNC: 92 NG/ML — SIGNIFICANT CHANGE UP (ref 15–150)
GLUCOSE SERPL-MCNC: 96 MG/DL — SIGNIFICANT CHANGE UP (ref 70–99)
HCT VFR BLD CALC: 36.7 % — SIGNIFICANT CHANGE UP (ref 34.5–45)
HGB BLD-MCNC: 11 G/DL — LOW (ref 11.5–15.5)
IMM GRANULOCYTES NFR BLD AUTO: 0.3 % — SIGNIFICANT CHANGE UP (ref 0–1.5)
LDH SERPL L TO P-CCNC: 335 U/L — HIGH (ref 50–242)
LYMPHOCYTES # BLD AUTO: 1.2 K/UL — SIGNIFICANT CHANGE UP (ref 1–3.3)
LYMPHOCYTES # BLD AUTO: 35.7 % — SIGNIFICANT CHANGE UP (ref 13–44)
MAGNESIUM SERPL-MCNC: 1.9 MG/DL — SIGNIFICANT CHANGE UP (ref 1.6–2.6)
MCHC RBC-ENTMCNC: 25.3 PG — LOW (ref 27–34)
MCHC RBC-ENTMCNC: 30 GM/DL — LOW (ref 32–36)
MCV RBC AUTO: 84.6 FL — SIGNIFICANT CHANGE UP (ref 80–100)
MONOCYTES # BLD AUTO: 0.32 K/UL — SIGNIFICANT CHANGE UP (ref 0–0.9)
MONOCYTES NFR BLD AUTO: 9.5 % — SIGNIFICANT CHANGE UP (ref 2–14)
NEUTROPHILS # BLD AUTO: 1.79 K/UL — LOW (ref 1.8–7.4)
NEUTROPHILS NFR BLD AUTO: 53.3 % — SIGNIFICANT CHANGE UP (ref 43–77)
NRBC # BLD: 0 /100 WBCS — SIGNIFICANT CHANGE UP (ref 0–0)
PHOSPHATE SERPL-MCNC: 2.8 MG/DL — SIGNIFICANT CHANGE UP (ref 2.5–4.5)
PLATELET # BLD AUTO: 180 K/UL — SIGNIFICANT CHANGE UP (ref 150–400)
POTASSIUM SERPL-MCNC: 4.1 MMOL/L — SIGNIFICANT CHANGE UP (ref 3.5–5.3)
POTASSIUM SERPL-SCNC: 4.1 MMOL/L — SIGNIFICANT CHANGE UP (ref 3.5–5.3)
PROCALCITONIN SERPL-MCNC: 0.09 NG/ML — SIGNIFICANT CHANGE UP (ref 0.02–0.1)
PROT SERPL-MCNC: 7.5 G/DL — SIGNIFICANT CHANGE UP (ref 6–8.3)
RBC # BLD: 4.34 M/UL — SIGNIFICANT CHANGE UP (ref 3.8–5.2)
RBC # FLD: 17.3 % — HIGH (ref 10.3–14.5)
SODIUM SERPL-SCNC: 138 MMOL/L — SIGNIFICANT CHANGE UP (ref 135–145)
WBC # BLD: 3.36 K/UL — LOW (ref 3.8–10.5)
WBC # FLD AUTO: 3.36 K/UL — LOW (ref 3.8–10.5)

## 2020-03-18 RX ADMIN — Medication 80 MILLIGRAM(S): at 05:41

## 2020-03-18 RX ADMIN — ENOXAPARIN SODIUM 40 MILLIGRAM(S): 100 INJECTION SUBCUTANEOUS at 10:44

## 2020-03-18 RX ADMIN — Medication 5 MILLIGRAM(S): at 05:41

## 2020-03-18 RX ADMIN — Medication 650 MILLIGRAM(S): at 05:42

## 2020-03-18 RX ADMIN — Medication 200 MILLIGRAM(S): at 05:41

## 2020-03-18 RX ADMIN — CEFTRIAXONE 100 MILLIGRAM(S): 500 INJECTION, POWDER, FOR SOLUTION INTRAMUSCULAR; INTRAVENOUS at 10:43

## 2020-03-18 RX ADMIN — Medication 500 MILLIGRAM(S): at 14:16

## 2020-03-18 RX ADMIN — Medication 500 MILLIGRAM(S): at 05:41

## 2020-03-18 RX ADMIN — Medication 100 MILLIGRAM(S): at 05:41

## 2020-03-18 RX ADMIN — Medication 81 MILLIGRAM(S): at 10:46

## 2020-03-18 RX ADMIN — Medication 100 MILLIGRAM(S): at 14:19

## 2020-03-18 RX ADMIN — Medication 200 MILLIGRAM(S): at 18:04

## 2020-03-18 RX ADMIN — Medication 650 MILLIGRAM(S): at 06:42

## 2020-03-18 RX ADMIN — CITALOPRAM 40 MILLIGRAM(S): 10 TABLET, FILM COATED ORAL at 10:44

## 2020-03-18 RX ADMIN — Medication 25 MICROGRAM(S): at 05:41

## 2020-03-18 RX ADMIN — PANTOPRAZOLE SODIUM 20 MILLIGRAM(S): 20 TABLET, DELAYED RELEASE ORAL at 05:41

## 2020-03-18 NOTE — PROGRESS NOTE ADULT - ASSESSMENT
65yo F with rheumatoid arthritis, fibromyalgia, presents from [facility] complaining of a 4-5 day of subjective fevers and a dry cough (resolved approximately 2 days ago). Now COVID-19 positive.

## 2020-03-18 NOTE — PROGRESS NOTE ADULT - ASSESSMENT
ASSESSMENT/PLAN 64F with rheumatoid arthritis, fibromyalgia, presents from [facility] complaining of a 4-5 day of subjective fevers and a dry cough (resolved approximately 2 days ago). Patient is otherwise feeling well, only complaining of sweating. Admitted for COVID infection  and treatment of CAP    1. O2 attempt off   2. Bronchodilators:  Atrovent/ albuterol q 4 – 6 hours as needed  3. Corticosteroids: prednisone 5mg daily  4. ID/Antibiotics: Rocephin  5. Cardiac/HTN: optimize BP  6. GI: Rx/ prophylaxis c PPI/H2B  7. Heme: Rx/VT prophylaxis c SQH/SCD/AC on Enoxaparin  8. Aspiration precautions  9. Continue Plaquenil  10. Supportive care, nutrition  Discussed with managing team

## 2020-03-18 NOTE — PROGRESS NOTE ADULT - SUBJECTIVE AND OBJECTIVE BOX
Interventional, Pulmonary, Critical, Chest Special Procedures.    Pt was seen and fully examined by myself.     Time spent with patient in minutes:200  High risk  PPE assembled and worn throughout encounter    Patient is a 64y old  Female who presents with a chief complaint of Fever + cough COVID exposure (18 Mar 2020 14:48) Overnight: febrile to 100.7 o/n. Tylenol PO. The patient ill appearing, eupneic when seen, more engaging    HPI:  64F with rheumatoid arthritis, fibromyalgia, presents from [facility] complaining of a 4-5 day of subjective fevers and a dry cough (resolved approximately 2 days ago). Patient expressed concern for COVID-19 given that numerous residents at the facility were in contact with a COVID-19 positive patient. Patient had not been directly in contact with any COVID-19 positive patient, and her roommate is not positive as well, nor symptomatic. Patient during time of evaluation in the ED felt "fine, I am only concerned whether or not I have the virus, and why I am sweating so much. I feel very hot." Patent denies chills, nausea, vomiting, changes in bowel movement (had 1 bowel movement during encounter). 12 point review of system negative for everything except some intermittent joint pain related to her RA. Patient otherwise feels close to her baseline, when asked scale 1-10, 10 being 100% normal, patient stated 8-9.    ED vitals: T 101.3, HR 73, /72, RR 18, 96%  ED labs: Na 113, , ALT 97, U/A dirty (negative), RVP negative  ED studies: CXR w/ elevated R hemidiaphragm, b/l increased interstitial markings  ED course: 1L NS bolus, Ceftriaxone 1g at 11 AM (15 Mar 2020 13:34)      REVIEW OF SYSTEMS: All other review of systems negative, except as noted in HPI  Constitutional: No fever, weight loss, chills or fatigue  Eyes: No eye pain, visual disturbances, or discharge  ENMT:  No difficulty hearing, tinnitus, vertigo; No sinus or throat pain. No epistaxis, dysphagia, dysphonia, hoarseness or odynophagia  Neck: No pain, stiffness or neck swelling.  No masses or deformities  Respiratory: No cough, wheezing, hemoptysis  - COPD  - ILD   - PE   - ASTHMA     - PNEUMONIA  Cardiovascular: No chest pain, dysrhythmia, palpitations, dizziness or edema - CAD   - CHF   - HTN  Gastrointestinal: No abdominal or epigastric pain. No nausea, vomiting or hematemesis; No diarrhea or constipation. No melena or hematochezia, Icterus.          Genitourinary: No dysuria, frequency, hematuria or incontinence   - CKD/JAMAL      - ESRD  Neurological: No headaches, memory loss, loss of strength, numbness or tremors      -DEMENTIA     - STROKE    - SEIZURE  Skin: No itching, burning, rashes or lesions   Lymph Nodes: No enlarged glands  Endocrine: No heat or cold intolerance; No hair loss       - DM     - THYROID DISORDER  Musculoskeletal: No joint pain or swelling; No muscle, back or extremity pain, No edema  Psychiatric: No depression, anxiety, mood swings or difficulty sleeping  Heme/Lymph: No easy bruising or bleeding gums         - ANEMIA      - CANCER   -COAGULOPATHY  Allergy and Immunologic: No hives or eczema    PAST MEDICAL & SURGICAL HISTORY:  Fibromyalgia  Depression  Hypothyroid  Osteoporosis  RA (rheumatoid arthritis)    FAMILY HISTORY:    SOCIAL HISTORY:      - Tobacco     - ETOH    Allergies    clindamycin (Unknown)  piperacillin (Unknown)  vancomycin (Unknown)    Intolerances      Vital Signs Last 24 Hrs  T(C): 36.5 (18 Mar 2020 13:55), Max: 38.2 (17 Mar 2020 23:11)  T(F): 97.7 (18 Mar 2020 13:55), Max: 100.7 (17 Mar 2020 23:11)  HR: 70 (18 Mar 2020 13:55) (70 - 88)  BP: 106/65 (18 Mar 2020 13:55) (106/62 - 109/68)  BP(mean): --  RR: 18 (18 Mar 2020 13:55) (16 - 19)  SpO2: 92% (18 Mar 2020 13:55) (90% - 94%)        MEDICATIONS:  MEDICATIONS  (STANDING):  aspirin enteric coated 81 milliGRAM(s) Oral daily  cefTRIAXone   IVPB 1000 milliGRAM(s) IV Intermittent every 24 hours  citalopram 40 milliGRAM(s) Oral daily  enoxaparin Injectable 40 milliGRAM(s) SubCutaneous every 24 hours  hydroxychloroquine 200 milliGRAM(s) Oral every 12 hours  levothyroxine 25 MICROGram(s) Oral daily  pantoprazole    Tablet 20 milliGRAM(s) Oral before breakfast  predniSONE   Tablet 5 milliGRAM(s) Oral daily  pregabalin 100 milliGRAM(s) Oral three times a day  propranolol LA 80 milliGRAM(s) Oral daily  sulfaSALAzine 500 milliGRAM(s) Oral every 8 hours    MEDICATIONS  (PRN):  acetaminophen   Tablet .. 650 milliGRAM(s) Oral every 6 hours PRN Temp greater or equal to 38C (100.4F), Mild Pain (1 - 3)      PHYSICAL EXAM:  Un Comfortable, no immediate distress  Eyes: PERRL, EOM intact; conjunctiva and sclera clear  Head: Normocephalic;  No Trauma  ENMT: No nasal discharge, some hoarseness, no cough or hemoptysis  Neck: Supple; non tender; no masses or deformities.    No JVD  Respiratory:  - WHEEZING   - RHONCHI  few basilar RALES  + CRACKLES.  Diminished breath sounds  BILATERAL  RIGHT  LEFT bases   Cardiovascular: Regular rate and rhythm. S1 and S2 Normal; No murmurs, gallops or rubs     - PPM/AICD  Gastrointestinal: Soft non-tender, non-distended; Normal bowel sounds; No hepatosplenomegaly.     -PEG    -  GT   - DE LUNA  Genitourinary: No costovertebral angle tenderness. No dysuria  Extremities: AROM, hands and feet with multiple joint deformities 2/2 to RA, BL LE edema   Vascular: Peripheral pulses palpable 2+ bilaterally  Neurological: Alert and responisve to stimuli   Skin: Warm and dry. No obvious rash  Lymph Nodes: No acute cervical or supraclavicular adenopathy  Psychiatric: Cooperative and appropriate mood    DEVICES:  - DENTURES   +IV R / L     - ETUBE   -TRACH   -CTUBE  R / L    LABS:                          11.0   3.36  )-----------( 180      ( 18 Mar 2020 05:43 )             36.7     03-18    138  |  104  |  11  ----------------------------<  96  4.1   |  22  |  0.65    Ca    8.2<L>      18 Mar 2020 05:43  Phos  2.8     03-18  Mg     1.9     03-18    TPro  7.5  /  Alb  3.2<L>  /  TBili  0.2  /  DBili  x   /  AST  73<H>  /  ALT  62<H>  /  AlkPhos  62  03-18      < from: Xray Chest 1 View-PORTABLE IMMEDIATE (03.15.20 @ 11:51) >  XAM:  XR CHEST PORTABLE IMMED 1V                          PROCEDURE DATE:  03/15/2020          INTERPRETATION:  Clinical history/reason for exam: Sepsis.    Single frontal view rotated to the right    No comparison.    Findings/  impression: Rightbasilar opacity. Bibasilar focal atelectasis. Right cardiac border obscured.    < end of copied text >  RADIOLOGY & ADDITIONAL STUDIES (The following images were personally reviewed):

## 2020-03-18 NOTE — PROGRESS NOTE ADULT - PROBLEM SELECTOR PLAN 1
Patient with hx of cough and fever now resolved and currently without symptoms.   - WBC 3.36 (differential with decreased neutrophils)   - CRP 2.84,  (both inc.)  - Ferritin normal  - COVID +  92% on 2L NC. Patient with hx of cough and fever now resolved and currently without symptoms.   - WBC 3.36 (differential with decreased neutrophils)   - CRP 2.84,  (both inc.)  - Ferritin normal  - COVID +  92% on 2L NC.  - CAP tx with Ceftriaxone 1gram, QD. (started 3/15)

## 2020-03-18 NOTE — PROGRESS NOTE ADULT - SUBJECTIVE AND OBJECTIVE BOX
Interval / Overnight: febrile to 100.7 o/n. Tylenol PO.     Subjective: Ms. Hewitt tells me she thinks the room is too hot and is requesting some ice water. She denies any medical complaints including fevers, chills, sore throat, nasal congestion, SOB, n/v.     VITAL SIGNS:  Vital Signs Last 24 Hrs  T(C): 36.5 (18 Mar 2020 13:55), Max: 38.2 (17 Mar 2020 23:11)  T(F): 97.7 (18 Mar 2020 13:55), Max: 100.7 (17 Mar 2020 23:11)  HR: 70 (18 Mar 2020 13:55) (70 - 88)  BP: 106/65 (18 Mar 2020 13:55) (106/62 - 109/68)  BP(mean): --  RR: 18 (18 Mar 2020 13:55) (16 - 19)  SpO2: 92% (18 Mar 2020 13:55) (90% - 94%)    PHYSICAL EXAM:    General: in NAD, lying comfortably in bed  HEENT: normocephalic, atraumatic; PERRL, anicteric sclera; MMM  Neck: supple, no thyromegaly, no lymphadenopathy  Cardiovascular: dificult to auscultation 2/2 to body habitus but RRR -- borderline tachy  Respiratory: Breathing comfortably, no accessory muscle use; mostly CTABL with mild rales BL bases   Gastrointestinal: soft, NT/ND; +BSx4  Extremities: WWP; hands and feet with multiple joint deformities 2/2 to RA, BL LE edema   Neurological: Alert & O x 3     MEDICATIONS:  MEDICATIONS  (STANDING):  aspirin enteric coated 81 milliGRAM(s) Oral daily  cefTRIAXone   IVPB 1000 milliGRAM(s) IV Intermittent every 24 hours  citalopram 40 milliGRAM(s) Oral daily  enoxaparin Injectable 40 milliGRAM(s) SubCutaneous every 24 hours  hydroxychloroquine 200 milliGRAM(s) Oral every 12 hours  levothyroxine 25 MICROGram(s) Oral daily  pantoprazole    Tablet 20 milliGRAM(s) Oral before breakfast  predniSONE   Tablet 5 milliGRAM(s) Oral daily  pregabalin 100 milliGRAM(s) Oral three times a day  propranolol LA 80 milliGRAM(s) Oral daily  sulfaSALAzine 500 milliGRAM(s) Oral every 8 hours    MEDICATIONS  (PRN):  acetaminophen   Tablet .. 650 milliGRAM(s) Oral every 6 hours PRN Temp greater or equal to 38C (100.4F), Mild Pain (1 - 3)      ALLERGIES:  Allergies    clindamycin (Unknown)  piperacillin (Unknown)  vancomycin (Unknown)    Intolerances        LABS:                        11.0   3.36  )-----------( 180      ( 18 Mar 2020 05:43 )             36.7     03-18    138  |  104  |  11  ----------------------------<  96  4.1   |  22  |  0.65    Ca    8.2<L>      18 Mar 2020 05:43  Phos  2.8     03-18  Mg     1.9     03-18    TPro  7.5  /  Alb  3.2<L>  /  TBili  0.2  /  DBili  x   /  AST  73<H>  /  ALT  62<H>  /  AlkPhos  62  03-18        CAPILLARY BLOOD GLUCOSE          RADIOLOGY & ADDITIONAL TESTS: Reviewed.

## 2020-03-19 LAB
ANION GAP SERPL CALC-SCNC: 12 MMOL/L — SIGNIFICANT CHANGE UP (ref 5–17)
BASOPHILS # BLD AUTO: 0.01 K/UL — SIGNIFICANT CHANGE UP (ref 0–0.2)
BASOPHILS NFR BLD AUTO: 0.3 % — SIGNIFICANT CHANGE UP (ref 0–2)
BUN SERPL-MCNC: 8 MG/DL — SIGNIFICANT CHANGE UP (ref 7–23)
CALCIUM SERPL-MCNC: 8.5 MG/DL — SIGNIFICANT CHANGE UP (ref 8.4–10.5)
CHLORIDE SERPL-SCNC: 103 MMOL/L — SIGNIFICANT CHANGE UP (ref 96–108)
CO2 SERPL-SCNC: 21 MMOL/L — LOW (ref 22–31)
CREAT SERPL-MCNC: 0.61 MG/DL — SIGNIFICANT CHANGE UP (ref 0.5–1.3)
EOSINOPHIL # BLD AUTO: 0.02 K/UL — SIGNIFICANT CHANGE UP (ref 0–0.5)
EOSINOPHIL NFR BLD AUTO: 0.6 % — SIGNIFICANT CHANGE UP (ref 0–6)
FERRITIN SERPL-MCNC: 98 NG/ML — SIGNIFICANT CHANGE UP (ref 15–150)
GLUCOSE SERPL-MCNC: 101 MG/DL — HIGH (ref 70–99)
HCT VFR BLD CALC: 36.4 % — SIGNIFICANT CHANGE UP (ref 34.5–45)
HGB BLD-MCNC: 10.9 G/DL — LOW (ref 11.5–15.5)
IMM GRANULOCYTES NFR BLD AUTO: 0.3 % — SIGNIFICANT CHANGE UP (ref 0–1.5)
LDH SERPL L TO P-CCNC: 338 U/L — HIGH (ref 50–242)
LYMPHOCYTES # BLD AUTO: 1 K/UL — SIGNIFICANT CHANGE UP (ref 1–3.3)
LYMPHOCYTES # BLD AUTO: 28 % — SIGNIFICANT CHANGE UP (ref 13–44)
MCHC RBC-ENTMCNC: 24.6 PG — LOW (ref 27–34)
MCHC RBC-ENTMCNC: 29.9 GM/DL — LOW (ref 32–36)
MCV RBC AUTO: 82.2 FL — SIGNIFICANT CHANGE UP (ref 80–100)
MONOCYTES # BLD AUTO: 0.3 K/UL — SIGNIFICANT CHANGE UP (ref 0–0.9)
MONOCYTES NFR BLD AUTO: 8.4 % — SIGNIFICANT CHANGE UP (ref 2–14)
NEUTROPHILS # BLD AUTO: 2.23 K/UL — SIGNIFICANT CHANGE UP (ref 1.8–7.4)
NEUTROPHILS NFR BLD AUTO: 62.4 % — SIGNIFICANT CHANGE UP (ref 43–77)
NRBC # BLD: 0 /100 WBCS — SIGNIFICANT CHANGE UP (ref 0–0)
PLATELET # BLD AUTO: 182 K/UL — SIGNIFICANT CHANGE UP (ref 150–400)
POTASSIUM SERPL-MCNC: 3.9 MMOL/L — SIGNIFICANT CHANGE UP (ref 3.5–5.3)
POTASSIUM SERPL-SCNC: 3.9 MMOL/L — SIGNIFICANT CHANGE UP (ref 3.5–5.3)
RBC # BLD: 4.43 M/UL — SIGNIFICANT CHANGE UP (ref 3.8–5.2)
RBC # FLD: 17.3 % — HIGH (ref 10.3–14.5)
SODIUM SERPL-SCNC: 136 MMOL/L — SIGNIFICANT CHANGE UP (ref 135–145)
WBC # BLD: 3.57 K/UL — LOW (ref 3.8–10.5)
WBC # FLD AUTO: 3.57 K/UL — LOW (ref 3.8–10.5)

## 2020-03-19 RX ADMIN — CITALOPRAM 40 MILLIGRAM(S): 10 TABLET, FILM COATED ORAL at 11:34

## 2020-03-19 RX ADMIN — Medication 500 MILLIGRAM(S): at 21:23

## 2020-03-19 RX ADMIN — Medication 100 MILLIGRAM(S): at 06:01

## 2020-03-19 RX ADMIN — Medication 100 MILLIGRAM(S): at 14:07

## 2020-03-19 RX ADMIN — ENOXAPARIN SODIUM 40 MILLIGRAM(S): 100 INJECTION SUBCUTANEOUS at 11:34

## 2020-03-19 RX ADMIN — Medication 500 MILLIGRAM(S): at 06:01

## 2020-03-19 RX ADMIN — Medication 5 MILLIGRAM(S): at 06:01

## 2020-03-19 RX ADMIN — Medication 200 MILLIGRAM(S): at 06:01

## 2020-03-19 RX ADMIN — Medication 200 MILLIGRAM(S): at 18:19

## 2020-03-19 RX ADMIN — Medication 81 MILLIGRAM(S): at 11:34

## 2020-03-19 RX ADMIN — CEFTRIAXONE 100 MILLIGRAM(S): 500 INJECTION, POWDER, FOR SOLUTION INTRAMUSCULAR; INTRAVENOUS at 11:33

## 2020-03-19 RX ADMIN — Medication 500 MILLIGRAM(S): at 14:07

## 2020-03-19 RX ADMIN — Medication 25 MICROGRAM(S): at 06:00

## 2020-03-19 RX ADMIN — Medication 80 MILLIGRAM(S): at 06:01

## 2020-03-19 RX ADMIN — PANTOPRAZOLE SODIUM 20 MILLIGRAM(S): 20 TABLET, DELAYED RELEASE ORAL at 06:01

## 2020-03-19 RX ADMIN — Medication 100 MILLIGRAM(S): at 21:23

## 2020-03-19 NOTE — PROGRESS NOTE ADULT - SUBJECTIVE AND OBJECTIVE BOX
Interventional, Pulmonary, Critical, Chest Special Procedures.    Pt was seen and fully examined by myself.     Time spent with patient in minutes:97  High risk  PPE assembled and worn throughout encounter  Patient is a 64y old  Female who presents with a chief complaint of COVID r/o (19 Mar 2020 10:27) The patient now eupneic on RA, anxious and frustrated.  Pain free when seen    HPI:  64F with rheumatoid arthritis, fibromyalgia, presents from [facility] complaining of a 4-5 day of subjective fevers and a dry cough (resolved approximately 2 days ago). Patient expressed concern for COVID-19 given that numerous residents at the facility were in contact with a COVID-19 positive patient. Patient had not been directly in contact with any COVID-19 positive patient, and her roommate is not positive as well, nor symptomatic. Patient during time of evaluation in the ED felt "fine, I am only concerned whether or not I have the virus, and why I am sweating so much. I feel very hot." Patent denies chills, nausea, vomiting, changes in bowel movement (had 1 bowel movement during encounter). 12 point review of system negative for everything except some intermittent joint pain related to her RA. Patient otherwise feels close to her baseline, when asked scale 1-10, 10 being 100% normal, patient stated 8-9.    ED vitals: T 101.3, HR 73, /72, RR 18, 96%  ED labs: Na 113, , ALT 97, U/A dirty (negative), RVP negative  ED studies: CXR w/ elevated R hemidiaphragm, b/l increased interstitial markings  ED course: 1L NS bolus, Ceftriaxone 1g at 11 AM (15 Mar 2020 13:34)      REVIEW OF SYSTEMS:All other review of systems negative, except as noted in HPI  Constitutional: No fever, weight loss, chills or fatigue  Eyes: No eye pain, visual disturbances, or discharge  ENMT:  No difficulty hearing, tinnitus, vertigo; No sinus or throat pain. No epistaxis, dysphagia, dysphonia, hoarseness or odynophagia  Neck: No pain, stiffness or neck swelling.  No masses or deformities  Respiratory: No cough, wheezing, hemoptysis  - COPD  - ILD   - PE   - ASTHMA     - PNEUMONIA  Cardiovascular: No chest pain, dysrhythmia, palpitations, dizziness or edema - CAD   - CHF   - HTN  Gastrointestinal: No abdominal or epigastric pain. No nausea, vomiting or hematemesis; No diarrhea or constipation. No melena or hematochezia, Icterus.          Genitourinary: No dysuria, frequency, hematuria or incontinence   - CKD/JAMAL      - ESRD  Neurological: No headaches, memory loss, loss of strength, numbness or tremors      -DEMENTIA     - STROKE    - SEIZURE  Skin: No itching, burning, rashes or lesions   Lymph Nodes: No enlarged glands  Endocrine: No heat or cold intolerance; No hair loss       - DM     - THYROID DISORDER  Musculoskeletal: No joint pain or swelling; No muscle, back or extremity pain, No edema  Psychiatric: No depression, anxiety, mood swings or difficulty sleeping  Heme/Lymph: No easy bruising or bleeding gums         - ANEMIA      - CANCER   -COAGULOPATHY  Allergy and Immunologic: No hives or eczema    PAST MEDICAL & SURGICAL HISTORY:  Fibromyalgia  Depression  Hypothyroid  Osteoporosis  RA (rheumatoid arthritis)    FAMILY HISTORY:    SOCIAL HISTORY:      - Tobacco     - ETOH    Allergies    clindamycin (Unknown)  piperacillin (Unknown)  vancomycin (Unknown)    Intolerances      Vital Signs Last 24 Hrs  T(C): 36.6 (19 Mar 2020 14:45), Max: 37.9 (18 Mar 2020 21:54)  T(F): 97.8 (19 Mar 2020 14:45), Max: 100.2 (18 Mar 2020 21:54)  HR: 71 (19 Mar 2020 14:45) (71 - 75)  BP: 118/67 (19 Mar 2020 14:45) (101/59 - 118/67)  BP(mean): --  RR: 18 (19 Mar 2020 14:45) (17 - 18)  SpO2: 94% (19 Mar 2020 14:45) (90% - 94%)        MEDICATIONS:  MEDICATIONS  (STANDING):  aspirin enteric coated 81 milliGRAM(s) Oral daily  citalopram 40 milliGRAM(s) Oral daily  enoxaparin Injectable 40 milliGRAM(s) SubCutaneous every 24 hours  hydroxychloroquine 200 milliGRAM(s) Oral every 12 hours  levothyroxine 25 MICROGram(s) Oral daily  pantoprazole    Tablet 20 milliGRAM(s) Oral before breakfast  predniSONE   Tablet 5 milliGRAM(s) Oral daily  pregabalin 100 milliGRAM(s) Oral three times a day  propranolol LA 80 milliGRAM(s) Oral daily  sulfaSALAzine 500 milliGRAM(s) Oral every 8 hours    MEDICATIONS  (PRN):  acetaminophen   Tablet .. 650 milliGRAM(s) Oral every 6 hours PRN Temp greater or equal to 38C (100.4F), Mild Pain (1 - 3)      PHYSICAL EXAM:  Un Comfortable, no immediate distress  Eyes: PERRL, EOM intact; conjunctiva and sclera clear  Head: Normocephalic;  No Trauma  ENMT: No nasal discharge, some hoarseness, no cough or hemoptysis  Neck: Supple; non tender; no masses or deformities.    No JVD  Respiratory:  - WHEEZING   - RHONCHI  few basilar RALES  + CRACKLES.  Diminished breath sounds  BILATERAL  RIGHT  LEFT bases   Cardiovascular: Regular rate and rhythm. S1 and S2 Normal; No murmurs, gallops or rubs     - PPM/AICD  Gastrointestinal: Soft non-tender, non-distended; Normal bowel sounds; No hepatosplenomegaly.     -PEG    -  GT   - DE LUNA  Genitourinary: No costovertebral angle tenderness. No dysuria  Extremities: AROM, hands and feet with multiple joint deformities 2/2 to RA, BL LE edema   Vascular: Peripheral pulses palpable 2+ bilaterally  Neurological: Alert and responisve to stimuli   Skin: Warm and dry. No obvious rash  Lymph Nodes: No acute cervical or supraclavicular adenopathy  Psychiatric: Cooperative and appropriate mood    DEVICES:  - DENTURES   +IV R / L     - ETUBE   -TRACH   -CTUBE  R / L    LABS:                          10.9   3.57  )-----------( 182      ( 19 Mar 2020 07:10 )             36.4     03-19    136  |  103  |  8   ----------------------------<  101<H>  3.9   |  21<L>  |  0.61    Ca    8.5      19 Mar 2020 07:10  Phos  2.8     03-18  Mg     1.9     03-18    TPro  7.5  /  Alb  3.2<L>  /  TBili  0.2  /  DBili  x   /  AST  73<H>  /  ALT  62<H>  /  AlkPhos  62  03-18      RADIOLOGY & ADDITIONAL STUDIES (The following images were personally reviewed):

## 2020-03-19 NOTE — DIETITIAN INITIAL EVALUATION ADULT. - PROBLEM SELECTOR PLAN 1
5 day hx of subjective fever, dry cough, with elevated CRP and LDH (pending ferritin), no lymphopenia, concern for COVID-19 given [facility]  - f/u COVID-19 PCR  - C/w ceftriaxone 1g daily and azithromycin 500 for 3 days for CAP treatment for now  - f/u urine legionella

## 2020-03-19 NOTE — PROGRESS NOTE ADULT - SUBJECTIVE AND OBJECTIVE BOX
Interval / Overnight: temperature to 100.2 and got tylenol     Subjective: Ms. Hewitt tells me that she is without medical symptoms (fevers, chills, sore throat, cough, sob, n/v). She says "im going to go crazy" because she was unable to sleep due to excessive noise from her neighbor. She understands the necessity to stay, but is frustrated.     VITAL SIGNS:  Vital Signs Last 24 Hrs  T(C): 37.1 (19 Mar 2020 09:44), Max: 37.9 (18 Mar 2020 21:54)  T(F): 98.8 (19 Mar 2020 09:44), Max: 100.2 (18 Mar 2020 21:54)  HR: 73 (19 Mar 2020 09:44) (70 - 75)  BP: 115/71 (19 Mar 2020 09:44) (101/59 - 115/71)  BP(mean): --  RR: 18 (19 Mar 2020 09:44) (17 - 18)  SpO2: 94% (19 Mar 2020 09:44) (90% - 94%)    PHYSICAL EXAM:    General: in NAD, lying comfortably in bed, non-ill appearing  HEENT: normocephalic, atraumatic; PERRL, anicteric sclera; dry tongue   Neck: supple, no thyromegaly, no lymphadenopathy  Cardiovascular: +S1/S2, RRR, no M/G/R  Respiratory: Breathing comfortably, no accessory muscle use; mild rales but overall clear lungs   Gastrointestinal: soft, NT/ND; +BSx4  Extremities: WWP; joint deformities of hands and feet   Vascular: 2+ radial BL, 2+ DP BL  Neurological: Alert to person place and time     MEDICATIONS:  MEDICATIONS  (STANDING):  aspirin enteric coated 81 milliGRAM(s) Oral daily  cefTRIAXone   IVPB 1000 milliGRAM(s) IV Intermittent every 24 hours  citalopram 40 milliGRAM(s) Oral daily  enoxaparin Injectable 40 milliGRAM(s) SubCutaneous every 24 hours  hydroxychloroquine 200 milliGRAM(s) Oral every 12 hours  levothyroxine 25 MICROGram(s) Oral daily  pantoprazole    Tablet 20 milliGRAM(s) Oral before breakfast  predniSONE   Tablet 5 milliGRAM(s) Oral daily  pregabalin 100 milliGRAM(s) Oral three times a day  propranolol LA 80 milliGRAM(s) Oral daily  sulfaSALAzine 500 milliGRAM(s) Oral every 8 hours    MEDICATIONS  (PRN):  acetaminophen   Tablet .. 650 milliGRAM(s) Oral every 6 hours PRN Temp greater or equal to 38C (100.4F), Mild Pain (1 - 3)      ALLERGIES:  Allergies    clindamycin (Unknown)  piperacillin (Unknown)  vancomycin (Unknown)    Intolerances        LABS:                        10.9   3.57  )-----------( 182      ( 19 Mar 2020 07:10 )             36.4     03-19    136  |  103  |  8   ----------------------------<  101<H>  3.9   |  21<L>  |  0.61    Ca    8.5      19 Mar 2020 07:10  Phos  2.8     03-18  Mg     1.9     03-18    TPro  7.5  /  Alb  3.2<L>  /  TBili  0.2  /  DBili  x   /  AST  73<H>  /  ALT  62<H>  /  AlkPhos  62  03-18        CAPILLARY BLOOD GLUCOSE          RADIOLOGY & ADDITIONAL TESTS: Reviewed.

## 2020-03-19 NOTE — DIETITIAN INITIAL EVALUATION ADULT. - PHYSICAL APPEARANCE
172% of IBW.Unable to complete NFPE at this time due to COVID +..IBW used due to above 120% of IBW/obese

## 2020-03-19 NOTE — PROGRESS NOTE ADULT - ASSESSMENT
ASSESSMENT/PLAN 64F with rheumatoid arthritis, fibromyalgia, presents from [facility] complaining of a 4-5 day of subjective fevers and a dry cough (resolved approximately 2 days ago). Patient is otherwise feeling well, only complaining of sweating. Admitted for COVID infection  and treatment of CAP    1. O2 attempt off   2. Bronchodilators:  Atrovent/ albuterol q 4 – 6 hours as needed  3. Corticosteroids: prednisone 5mg daily  4. ID/Antibiotics: s/p Rocephin  5. Cardiac/HTN: optimize BP  6. GI: Rx/ prophylaxis c PPI/H2B  7. Heme: Rx/VT prophylaxis c SQH/SCD/AC on Enoxaparin  8. Aspiration precautions  9. Continue Plaquenil  10. Supportive care, nutrition  Discussed with managing team

## 2020-03-19 NOTE — PROGRESS NOTE ADULT - ASSESSMENT
63yo F with rheumatoid arthritis, fibromyalgia, presents from [facility] complaining of a 4-5 day of subjective fevers and a dry cough (resolved approximately 2 days ago). Now COVID-19 positive.

## 2020-03-19 NOTE — DIETITIAN INITIAL EVALUATION ADULT. - OTHER INFO
65y/o female with history of RA, Fibromyalgia admitted from NH with cough/fevers and found to be COVID 19+.RD unable to visit patient due to isolation. No N/V/D/C or pain. Skin intact .Noted temps. Patient will fair intake as per RN. Patient would not answer call from RD at this time. Preferring to sleep. Patient is 172% of IBW with noted obesity. Unable to quantify diet PTA at this time.

## 2020-03-19 NOTE — PROGRESS NOTE ADULT - PROBLEM SELECTOR PLAN 1
Patient with hx of cough and fever now resolved and currently without symptoms.   - WBC 3.57 (possibly 2/2 to RA vs. hydroxychlo)   - CRP 2.84,  (both inc.)  - Ferritin normal  - COVID +  94% on 2L NC.  - CAP tx with Ceftriaxone 1gram, QD. (started 3/15) --> still pending urine legionella

## 2020-03-19 NOTE — DIETITIAN INITIAL EVALUATION ADULT. - ENERGY NEEDS
Ht"5ft 8inches,IBW:140lbs +/-10%,172% of IBW(IBW used for calculations).BMI:36.7.Adjusted for COVID/ Temp demands

## 2020-03-20 LAB
ALBUMIN SERPL ELPH-MCNC: 3.3 G/DL — SIGNIFICANT CHANGE UP (ref 3.3–5)
ALP SERPL-CCNC: 58 U/L — SIGNIFICANT CHANGE UP (ref 40–120)
ALT FLD-CCNC: 45 U/L — SIGNIFICANT CHANGE UP (ref 10–45)
ANION GAP SERPL CALC-SCNC: 12 MMOL/L — SIGNIFICANT CHANGE UP (ref 5–17)
AST SERPL-CCNC: 63 U/L — HIGH (ref 10–40)
BASOPHILS # BLD AUTO: 0.02 K/UL — SIGNIFICANT CHANGE UP (ref 0–0.2)
BASOPHILS NFR BLD AUTO: 0.5 % — SIGNIFICANT CHANGE UP (ref 0–2)
BILIRUB SERPL-MCNC: 0.4 MG/DL — SIGNIFICANT CHANGE UP (ref 0.2–1.2)
BUN SERPL-MCNC: 9 MG/DL — SIGNIFICANT CHANGE UP (ref 7–23)
CALCIUM SERPL-MCNC: 8.5 MG/DL — SIGNIFICANT CHANGE UP (ref 8.4–10.5)
CHLORIDE SERPL-SCNC: 102 MMOL/L — SIGNIFICANT CHANGE UP (ref 96–108)
CO2 SERPL-SCNC: 23 MMOL/L — SIGNIFICANT CHANGE UP (ref 22–31)
CREAT SERPL-MCNC: 0.62 MG/DL — SIGNIFICANT CHANGE UP (ref 0.5–1.3)
CULTURE RESULTS: SIGNIFICANT CHANGE UP
CULTURE RESULTS: SIGNIFICANT CHANGE UP
EOSINOPHIL # BLD AUTO: 0.04 K/UL — SIGNIFICANT CHANGE UP (ref 0–0.5)
EOSINOPHIL NFR BLD AUTO: 0.9 % — SIGNIFICANT CHANGE UP (ref 0–6)
GLUCOSE SERPL-MCNC: 115 MG/DL — HIGH (ref 70–99)
HCT VFR BLD CALC: 36.3 % — SIGNIFICANT CHANGE UP (ref 34.5–45)
HGB BLD-MCNC: 11.1 G/DL — LOW (ref 11.5–15.5)
IMM GRANULOCYTES NFR BLD AUTO: 0.5 % — SIGNIFICANT CHANGE UP (ref 0–1.5)
LYMPHOCYTES # BLD AUTO: 0.84 K/UL — LOW (ref 1–3.3)
LYMPHOCYTES # BLD AUTO: 19.8 % — SIGNIFICANT CHANGE UP (ref 13–44)
MAGNESIUM SERPL-MCNC: 1.8 MG/DL — SIGNIFICANT CHANGE UP (ref 1.6–2.6)
MCHC RBC-ENTMCNC: 25.2 PG — LOW (ref 27–34)
MCHC RBC-ENTMCNC: 30.6 GM/DL — LOW (ref 32–36)
MCV RBC AUTO: 82.5 FL — SIGNIFICANT CHANGE UP (ref 80–100)
MONOCYTES # BLD AUTO: 0.36 K/UL — SIGNIFICANT CHANGE UP (ref 0–0.9)
MONOCYTES NFR BLD AUTO: 8.5 % — SIGNIFICANT CHANGE UP (ref 2–14)
NEUTROPHILS # BLD AUTO: 2.96 K/UL — SIGNIFICANT CHANGE UP (ref 1.8–7.4)
NEUTROPHILS NFR BLD AUTO: 69.8 % — SIGNIFICANT CHANGE UP (ref 43–77)
NRBC # BLD: 0 /100 WBCS — SIGNIFICANT CHANGE UP (ref 0–0)
PHOSPHATE SERPL-MCNC: 3.1 MG/DL — SIGNIFICANT CHANGE UP (ref 2.5–4.5)
PLATELET # BLD AUTO: 199 K/UL — SIGNIFICANT CHANGE UP (ref 150–400)
POTASSIUM SERPL-MCNC: 3.7 MMOL/L — SIGNIFICANT CHANGE UP (ref 3.5–5.3)
POTASSIUM SERPL-SCNC: 3.7 MMOL/L — SIGNIFICANT CHANGE UP (ref 3.5–5.3)
PROCALCITONIN SERPL-MCNC: 0.07 NG/ML — SIGNIFICANT CHANGE UP (ref 0.02–0.1)
PROT SERPL-MCNC: 7.6 G/DL — SIGNIFICANT CHANGE UP (ref 6–8.3)
RBC # BLD: 4.4 M/UL — SIGNIFICANT CHANGE UP (ref 3.8–5.2)
RBC # FLD: 17.3 % — HIGH (ref 10.3–14.5)
SODIUM SERPL-SCNC: 137 MMOL/L — SIGNIFICANT CHANGE UP (ref 135–145)
SPECIMEN SOURCE: SIGNIFICANT CHANGE UP
SPECIMEN SOURCE: SIGNIFICANT CHANGE UP
WBC # BLD: 4.24 K/UL — SIGNIFICANT CHANGE UP (ref 3.8–10.5)
WBC # FLD AUTO: 4.24 K/UL — SIGNIFICANT CHANGE UP (ref 3.8–10.5)

## 2020-03-20 RX ORDER — POTASSIUM CHLORIDE 20 MEQ
20 PACKET (EA) ORAL ONCE
Refills: 0 | Status: COMPLETED | OUTPATIENT
Start: 2020-03-20 | End: 2020-03-20

## 2020-03-20 RX ADMIN — Medication 200 MILLIGRAM(S): at 19:23

## 2020-03-20 RX ADMIN — CITALOPRAM 40 MILLIGRAM(S): 10 TABLET, FILM COATED ORAL at 13:08

## 2020-03-20 RX ADMIN — Medication 650 MILLIGRAM(S): at 09:23

## 2020-03-20 RX ADMIN — Medication 650 MILLIGRAM(S): at 06:10

## 2020-03-20 RX ADMIN — Medication 200 MILLIGRAM(S): at 06:01

## 2020-03-20 RX ADMIN — ENOXAPARIN SODIUM 40 MILLIGRAM(S): 100 INJECTION SUBCUTANEOUS at 13:07

## 2020-03-20 RX ADMIN — Medication 500 MILLIGRAM(S): at 06:02

## 2020-03-20 RX ADMIN — Medication 81 MILLIGRAM(S): at 13:08

## 2020-03-20 RX ADMIN — Medication 500 MILLIGRAM(S): at 21:30

## 2020-03-20 RX ADMIN — Medication 100 MILLIGRAM(S): at 09:02

## 2020-03-20 RX ADMIN — Medication 100 MILLIGRAM(S): at 21:30

## 2020-03-20 RX ADMIN — Medication 80 MILLIGRAM(S): at 06:02

## 2020-03-20 RX ADMIN — Medication 25 MICROGRAM(S): at 06:01

## 2020-03-20 RX ADMIN — PANTOPRAZOLE SODIUM 20 MILLIGRAM(S): 20 TABLET, DELAYED RELEASE ORAL at 06:02

## 2020-03-20 RX ADMIN — Medication 500 MILLIGRAM(S): at 13:08

## 2020-03-20 RX ADMIN — Medication 5 MILLIGRAM(S): at 06:04

## 2020-03-20 RX ADMIN — Medication 100 MILLIGRAM(S): at 13:08

## 2020-03-20 RX ADMIN — Medication 20 MILLIEQUIVALENT(S): at 09:02

## 2020-03-20 NOTE — PROGRESS NOTE ADULT - SUBJECTIVE AND OBJECTIVE BOX
PUD FOR DR RENDON    Pt was seen and fully examined by myself.     High risk with positive Covid-19 pneumonia.    PPE assembled and worn throughout encounter, including N95, jump suit and head gear.    All new data reviewed, including VS, lab, imaging, Rx and documentation.    Patient is a 64y old  Female who presents with a chief complaint of COVID r/o (19 Mar 2020 10:27) The patient now eupneic on RA, anxious and frustrated.  Pain due to RA. Occasional cough. Denies SOB and CP    HPI:  64F with rheumatoid arthritis, fibromyalgia, presents from [facility] complaining of a 4-5 day of subjective fevers and a dry cough (resolved approximately 2 days ago). Patient expressed concern for COVID-19 given that numerous residents at the facility were in contact with a COVID-19 positive patient. Patient had not been directly in contact with any COVID-19 positive patient, and her roommate is not positive as well, nor symptomatic. Patient during time of evaluation in the ED felt "fine, I am only concerned whether or not I have the virus, and why I am sweating so much. I feel very hot." Patent denies chills, nausea, vomiting, changes in bowel movement (had 1 bowel movement during encounter). 12 point review of system negative for everything except some intermittent joint pain related to her RA. Patient otherwise feels close to her baseline, when asked scale 1-10, 10 being 100% normal, patient stated 8-9.    ED vitals: T 101.3, HR 73, /72, RR 18, 96%  ED labs: Na 113, , ALT 97, U/A dirty (negative), RVP negative  ED studies: CXR w/ elevated R hemidiaphragm, b/l increased interstitial markings  ED course: 1L NS bolus, Ceftriaxone 1g at 11 AM (15 Mar 2020 13:34)      REVIEW OF SYSTEMS:All other review of systems negative, except as noted in HPI  Constitutional: No fever, weight loss, chills or fatigue  Eyes: No eye pain, visual disturbances, or discharge  ENMT:  No difficulty hearing, tinnitus, vertigo; No sinus or throat pain. No epistaxis, dysphagia, dysphonia, hoarseness or odynophagia  Neck: No pain, stiffness or neck swelling.  No masses or deformities  Respiratory: No cough, wheezing, hemoptysis  - COPD  - ILD   - PE   - ASTHMA     - PNEUMONIA  Cardiovascular: No chest pain, dysrhythmia, palpitations, dizziness or edema - CAD   - CHF   - HTN  Gastrointestinal: No abdominal or epigastric pain. No nausea, vomiting or hematemesis; No diarrhea or constipation. No melena or hematochezia, Icterus.          Genitourinary: No dysuria, frequency, hematuria or incontinence   - CKD/JAMAL      - ESRD  Neurological: No headaches, memory loss, loss of strength, numbness or tremors      -DEMENTIA     - STROKE    - SEIZURE  Skin: No itching, burning, rashes or lesions   Lymph Nodes: No enlarged glands  Endocrine: No heat or cold intolerance; No hair loss       - DM     - THYROID DISORDER  Musculoskeletal: No joint pain or swelling; No muscle, back or extremity pain, No edema  Psychiatric: No depression, anxiety, mood swings or difficulty sleeping  Heme/Lymph: No easy bruising or bleeding gums         - ANEMIA      - CANCER   -COAGULOPATHY  Allergy and Immunologic: No hives or eczema    PAST MEDICAL & SURGICAL HISTORY:  Fibromyalgia  Depression  Hypothyroid  Osteoporosis  RA (rheumatoid arthritis)    FAMILY HISTORY:    SOCIAL HISTORY:      - Tobacco     - ETOH    Allergies    clindamycin (Unknown)  piperacillin (Unknown)  vancomycin (Unknown)    Intolerances      Vital Signs Last 24 Hrs  T(C): 36.6 (19 Mar 2020 14:45), Max: 37.9 (18 Mar 2020 21:54)  T(F): 97.8 (19 Mar 2020 14:45), Max: 100.2 (18 Mar 2020 21:54)  HR: 71 (19 Mar 2020 14:45) (71 - 75)  BP: 118/67 (19 Mar 2020 14:45) (101/59 - 118/67)  BP(mean): --  RR: 18 (19 Mar 2020 14:45) (17 - 18)  SpO2: 94% (19 Mar 2020 14:45) (90% - 94%)        MEDICATIONS:  MEDICATIONS  (STANDING):  aspirin enteric coated 81 milliGRAM(s) Oral daily  citalopram 40 milliGRAM(s) Oral daily  enoxaparin Injectable 40 milliGRAM(s) SubCutaneous every 24 hours  hydroxychloroquine 200 milliGRAM(s) Oral every 12 hours  levothyroxine 25 MICROGram(s) Oral daily  pantoprazole    Tablet 20 milliGRAM(s) Oral before breakfast  predniSONE   Tablet 5 milliGRAM(s) Oral daily  pregabalin 100 milliGRAM(s) Oral three times a day  propranolol LA 80 milliGRAM(s) Oral daily  sulfaSALAzine 500 milliGRAM(s) Oral every 8 hours    MEDICATIONS  (PRN):  acetaminophen   Tablet .. 650 milliGRAM(s) Oral every 6 hours PRN Temp greater or equal to 38C (100.4F), Mild Pain (1 - 3)      PHYSICAL EXAM:  Un Comfortable, no immediate distress  Eyes: PERRL, EOM intact; conjunctiva and sclera clear  Head: Normocephalic;  No Trauma  ENMT: No nasal discharge, some hoarseness, no cough or hemoptysis  Neck: Supple; non tender; no masses or deformities.    No JVD  Respiratory:  - WHEEZING   - RHONCHI  few basilar RALES  + CRACKLES.  Diminished breath sounds  BILATERAL  RIGHT  LEFT bases   Cardiovascular: Regular rate and rhythm. S1 and S2 Normal; No murmurs, gallops or rubs     - PPM/AICD  Gastrointestinal: Soft non-tender, non-distended; Normal bowel sounds; No hepatosplenomegaly.     -PEG    -  GT   - DE LUNA  Genitourinary: No costovertebral angle tenderness. No dysuria  Extremities: AROM, hands and feet with multiple joint deformities 2/2 to RA, Bilateral LE edema and contractures due to RA   Vascular: Peripheral pulses palpable 2+ bilaterally  Neurological: Alert and responisve to stimuli   Skin: Warm and dry. No obvious rash  Lymph Nodes: No acute cervical or supraclavicular adenopathy  Psychiatric: Cooperative and appropriate mood    DEVICES:  - DENTURES   +IV R / L     - ETUBE   -TRACH   -CTUBE  R / L    LABS:                          10.9   3.57  )-----------( 182      ( 19 Mar 2020 07:10 )             36.4     03-19    136  |  103  |  8   ----------------------------<  101<H>  3.9   |  21<L>  |  0.61    Ca    8.5      19 Mar 2020 07:10  Phos  2.8     03-18  Mg     1.9     03-18    TPro  7.5  /  Alb  3.2<L>  /  TBili  0.2  /  DBili  x   /  AST  73<H>  /  ALT  62<H>  /  AlkPhos  62  03-18    RADIOLOGY & ADDITIONAL STUDIES (The following images were personally reviewed):    EXAM:  XR CHEST PORTABLE IMMED 1V                          PROCEDURE DATE:  03/15/2020      INTERPRETATION:  Clinical history/reason for exam: Sepsis.    Single frontal view rotated to the right    No comparison.    Findings/  impression: Right basilar opacity. Bibasilar focal atelectasis. Right cardiac border obscured.    ASSESSMENT/PLAN 64F with rheumatoid arthritis, fibromyalgia, presents from [facility] complaining of a 4-5 day of subjective fevers and a dry cough.  Admitted for positive COVID infection  and treatment of CAP. She has intermittent pain from RA. Not able to get out of the bed.    1. O2 attempt off   2. Bronchodilators:  Atrovent/ albuterol q 4 – 6 hours as needed. Use MDIs prn.  3. Corticosteroids: prednisone 5mg daily  4. ID/Antibiotics: s/p ceftriaxone  5. Cardiac/HTN: optimize BP  6. GI: Rx/ prophylaxis c PPI/H2B  7. Heme: Rx/VT prophylaxis c SQH/SCD/AC on Enoxaparin  8. Aspiration precautions  9. Continue Plaquenil at 200 mg q12h  10. Supportive care, nutrition  Discussed with managing team, Dr Rendon and Dr Padilla

## 2020-03-20 NOTE — PROGRESS NOTE ADULT - PROBLEM SELECTOR PLAN 1
Patient with hx of cough and fever now resolved and currently without symptoms.   - WBC 4.24  - CRP 2.84,  (both inc.)  - Ferritin normal  - COVID +  93% on 2L NC breathing at 18 / min  - CAP tx with Ceftriaxone 1gram, QD. (started 3/15) --> still pending urine legionella

## 2020-03-20 NOTE — PROGRESS NOTE ADULT - SUBJECTIVE AND OBJECTIVE BOX
Interval / Overnight: CHATO    Subjective: Ms. Hewitt had a fever this morning to 100.7. When asked about this she thought it was due to too many blankets. She denied having any symptoms. She denies sore throat, congestion, cough, sob, n/v. She says "everything is good".     VITAL SIGNS:  Vital Signs Last 24 Hrs  T(C): 38.2 (20 Mar 2020 06:23), Max: 38.2 (20 Mar 2020 06:23)  T(F): 100.7 (20 Mar 2020 06:23), Max: 100.7 (20 Mar 2020 06:23)  HR: 78 (20 Mar 2020 06:23) (69 - 78)  BP: 125/72 (20 Mar 2020 06:23) (100/61 - 125/72)  BP(mean): --  RR: 18 (20 Mar 2020 06:23) (18 - 18)  SpO2: 93% (20 Mar 2020 06:23) (93% - 94%)    PHYSICAL EXAM:    General: in NAD, lying comfortably in bed, non-ill appearing, a bit diaphoretic   HEENT: normocephalic, atraumatic; PERRL, anicteric sclera; dry tongue   Neck: supple, no thyromegaly, no lymphadenopathy  Cardiovascular: couldn't auscultate heart sounds today 2/2 to habitus   Respiratory: Breathing comfortably, no accessory muscle use; diffuse end expiratory wheezes in all lung fields  Extremities: WWP; joint deformities of hands and feet   Vascular: 2+ radial BL, 2+ DP BL  Neurological: Alert to person place and time     MEDICATIONS:  MEDICATIONS  (STANDING):  aspirin enteric coated 81 milliGRAM(s) Oral daily  citalopram 40 milliGRAM(s) Oral daily  enoxaparin Injectable 40 milliGRAM(s) SubCutaneous every 24 hours  hydroxychloroquine 200 milliGRAM(s) Oral every 12 hours  levothyroxine 25 MICROGram(s) Oral daily  pantoprazole    Tablet 20 milliGRAM(s) Oral before breakfast  potassium chloride    Tablet ER 20 milliEquivalent(s) Oral once  predniSONE   Tablet 5 milliGRAM(s) Oral daily  pregabalin 100 milliGRAM(s) Oral three times a day  propranolol LA 80 milliGRAM(s) Oral daily  sulfaSALAzine 500 milliGRAM(s) Oral every 8 hours    MEDICATIONS  (PRN):  acetaminophen   Tablet .. 650 milliGRAM(s) Oral every 6 hours PRN Temp greater or equal to 38C (100.4F), Mild Pain (1 - 3)      ALLERGIES:  Allergies    clindamycin (Unknown)  piperacillin (Unknown)  vancomycin (Unknown)    Intolerances        LABS:                        11.1   4.24  )-----------( 199      ( 20 Mar 2020 07:41 )             36.3     03-20    137  |  102  |  9   ----------------------------<  115<H>  3.7   |  23  |  0.62    Ca    8.5      20 Mar 2020 07:41  Phos  3.1     03-20  Mg     1.8     03-20    TPro  7.6  /  Alb  3.3  /  TBili  0.4  /  DBili  x   /  AST  63<H>  /  ALT  45  /  AlkPhos  58  03-20        CAPILLARY BLOOD GLUCOSE          RADIOLOGY & ADDITIONAL TESTS: Reviewed. Hospital Course:  64F with rheumatoid arthritis, fibromyalgia, presented from [facility] complaining of a 4-5 day of subjective fevers and a dry cough, found to be COVID-19 positive. Pt is hemodynamically stable and safe for discharge to [facility] pending 2 negative COVID-19 tests.           Interval / Overnight: CHATO    Subjective: Ms. Hewitt had a fever this morning to 100.7. When asked about this she thought it was due to too many blankets. She denied having any symptoms. She denies sore throat, congestion, cough, sob, n/v. She says "everything is good".     VITAL SIGNS:  Vital Signs Last 24 Hrs  T(C): 38.2 (20 Mar 2020 06:23), Max: 38.2 (20 Mar 2020 06:23)  T(F): 100.7 (20 Mar 2020 06:23), Max: 100.7 (20 Mar 2020 06:23)  HR: 78 (20 Mar 2020 06:23) (69 - 78)  BP: 125/72 (20 Mar 2020 06:23) (100/61 - 125/72)  BP(mean): --  RR: 18 (20 Mar 2020 06:23) (18 - 18)  SpO2: 93% (20 Mar 2020 06:23) (93% - 94%)    PHYSICAL EXAM:    General: in NAD, lying comfortably in bed, non-ill appearing, a bit diaphoretic   HEENT: normocephalic, atraumatic; PERRL, anicteric sclera; dry tongue   Neck: supple, no thyromegaly, no lymphadenopathy  Cardiovascular: couldn't auscultate heart sounds today 2/2 to habitus   Respiratory: Breathing comfortably, no accessory muscle use; diffuse end expiratory wheezes in all lung fields  Extremities: WWP; joint deformities of hands and feet   Vascular: 2+ radial BL, 2+ DP BL  Neurological: Alert to person place and time     MEDICATIONS:  MEDICATIONS  (STANDING):  aspirin enteric coated 81 milliGRAM(s) Oral daily  citalopram 40 milliGRAM(s) Oral daily  enoxaparin Injectable 40 milliGRAM(s) SubCutaneous every 24 hours  hydroxychloroquine 200 milliGRAM(s) Oral every 12 hours  levothyroxine 25 MICROGram(s) Oral daily  pantoprazole    Tablet 20 milliGRAM(s) Oral before breakfast  potassium chloride    Tablet ER 20 milliEquivalent(s) Oral once  predniSONE   Tablet 5 milliGRAM(s) Oral daily  pregabalin 100 milliGRAM(s) Oral three times a day  propranolol LA 80 milliGRAM(s) Oral daily  sulfaSALAzine 500 milliGRAM(s) Oral every 8 hours    MEDICATIONS  (PRN):  acetaminophen   Tablet .. 650 milliGRAM(s) Oral every 6 hours PRN Temp greater or equal to 38C (100.4F), Mild Pain (1 - 3)      ALLERGIES:  Allergies    clindamycin (Unknown)  piperacillin (Unknown)  vancomycin (Unknown)    Intolerances        LABS:                        11.1   4.24  )-----------( 199      ( 20 Mar 2020 07:41 )             36.3     03-20    137  |  102  |  9   ----------------------------<  115<H>  3.7   |  23  |  0.62    Ca    8.5      20 Mar 2020 07:41  Phos  3.1     03-20  Mg     1.8     03-20    TPro  7.6  /  Alb  3.3  /  TBili  0.4  /  DBili  x   /  AST  63<H>  /  ALT  45  /  AlkPhos  58  03-20        CAPILLARY BLOOD GLUCOSE          RADIOLOGY & ADDITIONAL TESTS: Reviewed. Hospital Course:  64F with rheumatoid arthritis, fibromyalgia, presented from [facility] complaining of a 4-5 day of subjective fevers and a dry cough, found to be COVID-19 positive. Pt is hemodynamically stable and safe for discharge to [facility] pending 2 negative COVID-19 tests. She will need to be retested for COVID-19 on 3/22/20.           Interval / Overnight: CHATO    Subjective: Ms. Hewitt had a fever this morning to 100.7. When asked about this she thought it was due to too many blankets. She denied having any symptoms. She denies sore throat, congestion, cough, sob, n/v. She says "everything is good".     VITAL SIGNS:  Vital Signs Last 24 Hrs  T(C): 38.2 (20 Mar 2020 06:23), Max: 38.2 (20 Mar 2020 06:23)  T(F): 100.7 (20 Mar 2020 06:23), Max: 100.7 (20 Mar 2020 06:23)  HR: 78 (20 Mar 2020 06:23) (69 - 78)  BP: 125/72 (20 Mar 2020 06:23) (100/61 - 125/72)  BP(mean): --  RR: 18 (20 Mar 2020 06:23) (18 - 18)  SpO2: 93% (20 Mar 2020 06:23) (93% - 94%)    PHYSICAL EXAM:    General: in NAD, lying comfortably in bed, non-ill appearing, a bit diaphoretic   HEENT: normocephalic, atraumatic; PERRL, anicteric sclera; dry tongue   Neck: supple, no thyromegaly, no lymphadenopathy  Cardiovascular: couldn't auscultate heart sounds today 2/2 to habitus   Respiratory: Breathing comfortably, no accessory muscle use; diffuse end expiratory wheezes in all lung fields  Extremities: WWP; joint deformities of hands and feet   Vascular: 2+ radial BL, 2+ DP BL  Neurological: Alert to person place and time     MEDICATIONS:  MEDICATIONS  (STANDING):  aspirin enteric coated 81 milliGRAM(s) Oral daily  citalopram 40 milliGRAM(s) Oral daily  enoxaparin Injectable 40 milliGRAM(s) SubCutaneous every 24 hours  hydroxychloroquine 200 milliGRAM(s) Oral every 12 hours  levothyroxine 25 MICROGram(s) Oral daily  pantoprazole    Tablet 20 milliGRAM(s) Oral before breakfast  potassium chloride    Tablet ER 20 milliEquivalent(s) Oral once  predniSONE   Tablet 5 milliGRAM(s) Oral daily  pregabalin 100 milliGRAM(s) Oral three times a day  propranolol LA 80 milliGRAM(s) Oral daily  sulfaSALAzine 500 milliGRAM(s) Oral every 8 hours    MEDICATIONS  (PRN):  acetaminophen   Tablet .. 650 milliGRAM(s) Oral every 6 hours PRN Temp greater or equal to 38C (100.4F), Mild Pain (1 - 3)      ALLERGIES:  Allergies    clindamycin (Unknown)  piperacillin (Unknown)  vancomycin (Unknown)    Intolerances        LABS:                        11.1   4.24  )-----------( 199      ( 20 Mar 2020 07:41 )             36.3     03-20    137  |  102  |  9   ----------------------------<  115<H>  3.7   |  23  |  0.62    Ca    8.5      20 Mar 2020 07:41  Phos  3.1     03-20  Mg     1.8     03-20    TPro  7.6  /  Alb  3.3  /  TBili  0.4  /  DBili  x   /  AST  63<H>  /  ALT  45  /  AlkPhos  58  03-20        CAPILLARY BLOOD GLUCOSE          RADIOLOGY & ADDITIONAL TESTS: Reviewed.

## 2020-03-20 NOTE — PROGRESS NOTE ADULT - ASSESSMENT
65yo F with rheumatoid arthritis, fibromyalgia, presents from [facility] complaining of a 4-5 day of subjective fevers and a dry cough (resolved approximately 2 days ago). Now COVID-19 positive.

## 2020-03-21 LAB
ALBUMIN SERPL ELPH-MCNC: 3.1 G/DL — LOW (ref 3.3–5)
ALP SERPL-CCNC: 56 U/L — SIGNIFICANT CHANGE UP (ref 40–120)
ALT FLD-CCNC: 38 U/L — SIGNIFICANT CHANGE UP (ref 10–45)
ANION GAP SERPL CALC-SCNC: 12 MMOL/L — SIGNIFICANT CHANGE UP (ref 5–17)
AST SERPL-CCNC: 57 U/L — HIGH (ref 10–40)
BASOPHILS # BLD AUTO: 0.01 K/UL — SIGNIFICANT CHANGE UP (ref 0–0.2)
BASOPHILS NFR BLD AUTO: 0.2 % — SIGNIFICANT CHANGE UP (ref 0–2)
BILIRUB SERPL-MCNC: 0.4 MG/DL — SIGNIFICANT CHANGE UP (ref 0.2–1.2)
BUN SERPL-MCNC: 10 MG/DL — SIGNIFICANT CHANGE UP (ref 7–23)
CALCIUM SERPL-MCNC: 8.7 MG/DL — SIGNIFICANT CHANGE UP (ref 8.4–10.5)
CHLORIDE SERPL-SCNC: 102 MMOL/L — SIGNIFICANT CHANGE UP (ref 96–108)
CO2 SERPL-SCNC: 23 MMOL/L — SIGNIFICANT CHANGE UP (ref 22–31)
CREAT SERPL-MCNC: 0.56 MG/DL — SIGNIFICANT CHANGE UP (ref 0.5–1.3)
CRP SERPL-MCNC: 11.63 MG/DL — HIGH (ref 0–0.4)
D DIMER BLD IA.RAPID-MCNC: 310 NG/ML DDU — HIGH
EOSINOPHIL # BLD AUTO: 0.05 K/UL — SIGNIFICANT CHANGE UP (ref 0–0.5)
EOSINOPHIL NFR BLD AUTO: 1.2 % — SIGNIFICANT CHANGE UP (ref 0–6)
FERRITIN SERPL-MCNC: 132 NG/ML — SIGNIFICANT CHANGE UP (ref 15–150)
GLUCOSE SERPL-MCNC: 88 MG/DL — SIGNIFICANT CHANGE UP (ref 70–99)
HCT VFR BLD CALC: 35.8 % — SIGNIFICANT CHANGE UP (ref 34.5–45)
HGB BLD-MCNC: 11 G/DL — LOW (ref 11.5–15.5)
IMM GRANULOCYTES NFR BLD AUTO: 0.5 % — SIGNIFICANT CHANGE UP (ref 0–1.5)
LEGIONELLA AG UR QL: NEGATIVE — SIGNIFICANT CHANGE UP
LYMPHOCYTES # BLD AUTO: 0.78 K/UL — LOW (ref 1–3.3)
LYMPHOCYTES # BLD AUTO: 18.9 % — SIGNIFICANT CHANGE UP (ref 13–44)
MAGNESIUM SERPL-MCNC: 1.8 MG/DL — SIGNIFICANT CHANGE UP (ref 1.6–2.6)
MCHC RBC-ENTMCNC: 25.2 PG — LOW (ref 27–34)
MCHC RBC-ENTMCNC: 30.7 GM/DL — LOW (ref 32–36)
MCV RBC AUTO: 82.1 FL — SIGNIFICANT CHANGE UP (ref 80–100)
MONOCYTES # BLD AUTO: 0.38 K/UL — SIGNIFICANT CHANGE UP (ref 0–0.9)
MONOCYTES NFR BLD AUTO: 9.2 % — SIGNIFICANT CHANGE UP (ref 2–14)
NEUTROPHILS # BLD AUTO: 2.88 K/UL — SIGNIFICANT CHANGE UP (ref 1.8–7.4)
NEUTROPHILS NFR BLD AUTO: 70 % — SIGNIFICANT CHANGE UP (ref 43–77)
NRBC # BLD: 0 /100 WBCS — SIGNIFICANT CHANGE UP (ref 0–0)
PHOSPHATE SERPL-MCNC: 3 MG/DL — SIGNIFICANT CHANGE UP (ref 2.5–4.5)
PLATELET # BLD AUTO: 214 K/UL — SIGNIFICANT CHANGE UP (ref 150–400)
POTASSIUM SERPL-MCNC: 3.9 MMOL/L — SIGNIFICANT CHANGE UP (ref 3.5–5.3)
POTASSIUM SERPL-SCNC: 3.9 MMOL/L — SIGNIFICANT CHANGE UP (ref 3.5–5.3)
PROT SERPL-MCNC: 7.5 G/DL — SIGNIFICANT CHANGE UP (ref 6–8.3)
RBC # BLD: 4.36 M/UL — SIGNIFICANT CHANGE UP (ref 3.8–5.2)
RBC # FLD: 17.6 % — HIGH (ref 10.3–14.5)
SODIUM SERPL-SCNC: 137 MMOL/L — SIGNIFICANT CHANGE UP (ref 135–145)
WBC # BLD: 4.12 K/UL — SIGNIFICANT CHANGE UP (ref 3.8–10.5)
WBC # FLD AUTO: 4.12 K/UL — SIGNIFICANT CHANGE UP (ref 3.8–10.5)

## 2020-03-21 PROCEDURE — 99233 SBSQ HOSP IP/OBS HIGH 50: CPT | Mod: GC

## 2020-03-21 PROCEDURE — 71045 X-RAY EXAM CHEST 1 VIEW: CPT | Mod: 26

## 2020-03-21 RX ORDER — THIAMINE MONONITRATE (VIT B1) 100 MG
100 TABLET ORAL EVERY 24 HOURS
Refills: 0 | Status: DISCONTINUED | OUTPATIENT
Start: 2020-03-21 | End: 2020-03-22

## 2020-03-21 RX ORDER — AZITHROMYCIN 500 MG/1
250 TABLET, FILM COATED ORAL EVERY 24 HOURS
Refills: 0 | Status: DISCONTINUED | OUTPATIENT
Start: 2020-03-21 | End: 2020-03-26

## 2020-03-21 RX ORDER — ASCORBIC ACID 60 MG
500 TABLET,CHEWABLE ORAL DAILY
Refills: 0 | Status: DISCONTINUED | OUTPATIENT
Start: 2020-03-21 | End: 2020-03-22

## 2020-03-21 RX ADMIN — Medication 500 MILLIGRAM(S): at 21:08

## 2020-03-21 RX ADMIN — Medication 100 MILLIGRAM(S): at 21:08

## 2020-03-21 RX ADMIN — Medication 81 MILLIGRAM(S): at 14:10

## 2020-03-21 RX ADMIN — Medication 100 MILLIGRAM(S): at 05:54

## 2020-03-21 RX ADMIN — CITALOPRAM 40 MILLIGRAM(S): 10 TABLET, FILM COATED ORAL at 14:10

## 2020-03-21 RX ADMIN — Medication 500 MILLIGRAM(S): at 05:56

## 2020-03-21 RX ADMIN — Medication 80 MILLIGRAM(S): at 05:53

## 2020-03-21 RX ADMIN — Medication 500 MILLIGRAM(S): at 14:11

## 2020-03-21 RX ADMIN — AZITHROMYCIN 250 MILLIGRAM(S): 500 TABLET, FILM COATED ORAL at 10:05

## 2020-03-21 RX ADMIN — Medication 25 MICROGRAM(S): at 05:55

## 2020-03-21 RX ADMIN — ENOXAPARIN SODIUM 40 MILLIGRAM(S): 100 INJECTION SUBCUTANEOUS at 10:09

## 2020-03-21 RX ADMIN — Medication 100 MILLIGRAM(S): at 10:05

## 2020-03-21 RX ADMIN — Medication 5 MILLIGRAM(S): at 05:53

## 2020-03-21 RX ADMIN — Medication 500 MILLIGRAM(S): at 10:05

## 2020-03-21 RX ADMIN — Medication 100 MILLIGRAM(S): at 14:10

## 2020-03-21 RX ADMIN — Medication 200 MILLIGRAM(S): at 05:51

## 2020-03-21 RX ADMIN — PANTOPRAZOLE SODIUM 20 MILLIGRAM(S): 20 TABLET, DELAYED RELEASE ORAL at 06:02

## 2020-03-21 RX ADMIN — Medication 200 MILLIGRAM(S): at 17:36

## 2020-03-21 NOTE — PROGRESS NOTE ADULT - PROBLEM SELECTOR PLAN 1
Patient with hx of cough and fever now resolved and currently without symptoms.   - WBC 4.24  - CRP 2.84,  (both inc.)  - Ferritin normal  - COVID +  93% on 2L NC breathing at 18 / min  - CAP tx with Ceftriaxone 1gram, QD. (started 3/15) --> still pending urine legionella Patient with hx of cough and fever now resolved and currently without symptoms, tachypnea has worsened mildly increased since yesterday, pt does not endorse SOB/CP/Chest tightness  - WBC 4.12  - CRP increasing   - Ferritin normal  - COVID +  - Legionella negative   - CAP tx with Ceftriaxone 1gram, QD. (started 3/15)  - Increasing tachypnea requiring 3-4L NC, repeat CXR no interval change in infiltrates  -Will hold in COVID unit for o/n respiratory status monitoring

## 2020-03-21 NOTE — PROGRESS NOTE ADULT - PROBLEM SELECTOR PLAN 6
hx of depression  - C/w home citalopram 40 mg Patient with hx of depression  - C/w home citalopram 40 mg

## 2020-03-21 NOTE — PROGRESS NOTE ADULT - SUBJECTIVE AND OBJECTIVE BOX
PUD FOR DR RENDON AND DR PADILLA    Pt was seen and fully examined by myself.     High risk with positive Covid-19 pneumonia.    PPE assembled and worn throughout encounter, including N95, jump suit and head gear.    All new data reviewed, including VS, lab, imaging, Rx and documentation.    Patient is a 64y old  Female who presents with COVID. She is requiring 3 L NC, has low grade fever and her CXR shows bilateral worsening.    Her CRP has increased to 11 and she has low grade temperature. She is now on azithromycin 250/day and continued hydroxychloroquine.    Chronic pain due to RA. Occasional cough. Denies SOB and CP. She is unable to get out of bed.    HPI:  64F with rheumatoid arthritis, fibromyalgia, presents from [facility] complaining of a 4-5 day of subjective fevers and a dry cough (resolved approximately 2 days ago). Patient expressed concern for COVID-19 given that numerous residents at the facility were in contact with a COVID-19 positive patient. Patient had not been directly in contact with any COVID-19 positive patient, and her roommate is not positive as well, nor symptomatic. Patient during time of evaluation in the ED felt "fine, I am only concerned whether or not I have the virus, and why I am sweating so much. I feel very hot." Patent denies chills, nausea, vomiting, changes in bowel movement (had 1 bowel movement during encounter). 12 point review of system negative for everything except some intermittent joint pain related to her RA. Patient otherwise feels close to her baseline, when asked scale 1-10, 10 being 100% normal, patient stated 8-9.    ED vitals: T 101.3, HR 73, /72, RR 18, 96%  ED labs: Na 113, , ALT 97, U/A dirty (negative), RVP negative  ED studies: CXR w/ elevated R hemidiaphragm, b/l increased interstitial markings  ED course: 1L NS bolus, Ceftriaxone 1g at 11 AM (15 Mar 2020 13:34) and now discontinued    REVIEW OF SYSTEMS:All other review of systems negative, except as noted in HPI  Constitutional: No fever, weight loss, chills or fatigue  Eyes: No eye pain, visual disturbances, or discharge  ENMT:  No difficulty hearing, tinnitus, vertigo; No sinus or throat pain. No epistaxis, dysphagia, dysphonia, hoarseness or odynophagia  Neck: No pain, stiffness or neck swelling.  No masses or deformities  Respiratory: No cough, wheezing, hemoptysis  - COPD  - ILD   - PE   - ASTHMA     - PNEUMONIA  Cardiovascular: No chest pain, dysrhythmia, palpitations, dizziness or edema - CAD   - CHF   - HTN  Gastrointestinal: No abdominal or epigastric pain. No nausea, vomiting or hematemesis; No diarrhea or constipation. No melena or hematochezia, Icterus.          Genitourinary: No dysuria, frequency, hematuria or incontinence   - CKD/JAMAL      - ESRD  Neurological: No headaches, memory loss, loss of strength, numbness or tremors      -DEMENTIA     - STROKE    - SEIZURE  Skin: No itching, burning, rashes or lesions   Lymph Nodes: No enlarged glands  Endocrine: No heat or cold intolerance; No hair loss       - DM     - THYROID DISORDER  Musculoskeletal: No joint pain or swelling; No muscle, back or extremity pain, No edema  Psychiatric: No depression, anxiety, mood swings or difficulty sleeping  Heme/Lymph: No easy bruising or bleeding gums         - ANEMIA      - CANCER   -COAGULOPATHY  Allergy and Immunologic: No hives or eczema    PAST MEDICAL & SURGICAL HISTORY:  Fibromyalgia  Depression  Hypothyroid  Osteoporosis  RA (rheumatoid arthritis)    FAMILY HISTORY:    SOCIAL HISTORY:      - Tobacco     - ETOH    Allergies    clindamycin (Unknown)  piperacillin (Unknown)  vancomycin (Unknown)    Intolerances      Vital Signs Last 24 Hrs  T(C): 36.6 (19 Mar 2020 14:45), Max: 37.9 (18 Mar 2020 21:54)  T(F): 97.8 (19 Mar 2020 14:45), Max: 100.2 (18 Mar 2020 21:54)  HR: 71 (19 Mar 2020 14:45) (71 - 75)  BP: 118/67 (19 Mar 2020 14:45) (101/59 - 118/67)  BP(mean): --  RR: 18 (19 Mar 2020 14:45) (17 - 18)  SpO2: 94% (19 Mar 2020 14:45) (90% - 94%)        MEDICATIONS:  MEDICATIONS  (STANDING):  aspirin enteric coated 81 milliGRAM(s) Oral daily  citalopram 40 milliGRAM(s) Oral daily  enoxaparin Injectable 40 milliGRAM(s) SubCutaneous every 24 hours  hydroxychloroquine 200 milliGRAM(s) Oral every 12 hours  levothyroxine 25 MICROGram(s) Oral daily  pantoprazole    Tablet 20 milliGRAM(s) Oral before breakfast  predniSONE   Tablet 5 milliGRAM(s) Oral daily  pregabalin 100 milliGRAM(s) Oral three times a day  propranolol LA 80 milliGRAM(s) Oral daily  sulfaSALAzine 500 milliGRAM(s) Oral every 8 hours    MEDICATIONS  (PRN):  acetaminophen   Tablet .. 650 milliGRAM(s) Oral every 6 hours PRN Temp greater or equal to 38C (100.4F), Mild Pain (1 - 3)      PHYSICAL EXAM:  Uncomfortable, no immediate distress, in bed  Eyes: PERRL, EOM intact; conjunctiva and sclera clear  Head: Normocephalic;  No Trauma  ENMT: No nasal discharge, some hoarseness, no cough or hemoptysis  Neck: Supple; non tender; no masses or deformities.    No JVD  Respiratory:  - WHEEZING   - RHONCHI  few basilar RALES  + CRACKLES.  Diminished breath sounds  BILATERAL  RIGHT  LEFT bases   Cardiovascular: irregular rate and rhythm. S1 and S2 Normal; No murmurs, gallops or rubs     - PPM/AICD  Gastrointestinal: Soft non-tender, non-distended; Normal bowel sounds; No hepatosplenomegaly.     -PEG    -  GT   - DE LUNA  Genitourinary: No costovertebral angle tenderness. No dysuria  Extremities: AROM, hands and feet with multiple joint deformities 2/2 to RA, Bilateral LE edema and contractures due to RA   Vascular: Peripheral pulses palpable 2+ bilaterally  Neurological: Alert and responisve to stimuli   Skin: Warm and dry. No obvious rash  Lymph Nodes: No acute cervical or supraclavicular adenopathy  Psychiatric: Cooperative and appropriate mood    DEVICES:  - DENTURES   +IV R / L     - ETUBE   -TRACH   -CTUBE  R / L    LABS:                          10.9   3.57  )-----------( 182      ( 19 Mar 2020 07:10 )             36.4     03-19    136  |  103  |  8   ----------------------------<  101<H>  3.9   |  21<L>  |  0.61    Ca    8.5      19 Mar 2020 07:10  Phos  2.8     03-18  Mg     1.9     03-18  TPro  7.5  /  Alb  3.2<L>  /  TBili  0.2  /  DBili  x   /  AST  73<H>  /  ALT  62<H>  /  AlkPhos  62  03-18    D-dimer increased but clinically no evidence of PE. Continue DVT prophylaxis with enoxaparin.    RADIOLOGY & ADDITIONAL STUDIES (The following images were personally reviewed):    EXAM:  XR CHEST PORTABLE IMMED 1V                          PROCEDURE DATE:  03/15/2020      INTERPRETATION:  Clinical history/reason for exam: Sepsis.    Single frontal view rotated to the right    No comparison.    Findings/  impression: Right basilar opacity. Bibasilar focal atelectasis. Right cardiac border obscured.    ASSESSMENT/PLAN 64F with rheumatoid arthritis, fibromyalgia, presents from [facility] complaining of a 4-5 day of subjective fevers and a dry cough.  Admitted for positive COVID infection  and treatment of CAP. She has intermittent pain from RA. Not able to get out of the bed.    1. O2 at 3 L. Saturation was 92%.  2. Bronchodilators:  Atrovent/ albuterol q 4 – 6 hours as needed. Use MDIs prn.  3. Corticosteroids: prednisone 5mg daily  4. ID/Antibiotics: s/p ceftriaxone. Nisreen 250/day.  5. Cardiac/HTN: optimize BP  6. GI: Rx/ prophylaxis c PPI/H2B  7. Heme: Rx/VT prophylaxis c SQH/SCD/AC on Enoxaparin  8. Aspiration precautions  9. Continue Plaquenil at 200 mg q12h  10. Supportive care, nutrition  Discussed with managing team, Dr Rendon and Dr Padilla.

## 2020-03-21 NOTE — PROGRESS NOTE ADULT - SUBJECTIVE AND OBJECTIVE BOX
**Incomplete Note**    INTERVAL HPI/OVERNIGHT EVENTS:  Patient was seen and examined at bedside. As per nurse and patient, no o/n events, patient resting comfortably. No complaints at this time. Patient denies: fever, chills, dizziness, weakness, HA, Changes in vision, CP, palpitations, SOB, cough, N/V/D/C, dysuria, changes in bowel movements, LE edema. ROS otherwise negative.    VITAL SIGNS:  T(F): 100.4 (03-21-20 @ 00:22)  HR: 75 (03-20-20 @ 21:49)  BP: 108/61 (03-20-20 @ 21:49)  RR: 19 (03-20-20 @ 21:49)  SpO2: 94% (03-20-20 @ 21:49)  Wt(kg): --      03-20-20 @ 07:01  -  03-21-20 @ 06:32  --------------------------------------------------------  IN: 0 mL / OUT: 550 mL / NET: -550 mL        PHYSICAL EXAM:    Constitutional: WDWN resting comfortably in bed; NAD  HEENT: NC/AT, PERRL, EOMI, anicteric sclera, no nasal discharge; uvula midline, no oropharyngeal erythema or exudates; MMM  Neck: supple; no JVD or thyromegaly  Respiratory: CTA B/L; no W/R/R, no retractions  Cardiac: +S1/S2; RRR; no M/R/G; PMI non-displaced  Gastrointestinal: soft, NT/ND; no rebound or guarding; +BSx4  Genitourinary: normal external genitalia  Back: spine midline, no bony tenderness or step-offs; no CVAT B/L  Extremities: WWP, no clubbing or cyanosis; no peripheral edema  Musculoskeletal: NROM x4; no joint swelling, tenderness or erythema  Vascular: 2+ radial, DP/PT pulses B/L  Dermatologic: skin warm, dry and intact; no rashes, wounds, or scars  Lymphatic: no submandibular or cervical LAD  Neurologic: AAOx3; CNII-XII grossly intact; no focal deficits  Psychiatric: affect and characteristics of appearance, verbalizations, behaviors are appropriate, denies SI/HI/AH/VH    MEDICATIONS  (STANDING):  aspirin enteric coated 81 milliGRAM(s) Oral daily  citalopram 40 milliGRAM(s) Oral daily  enoxaparin Injectable 40 milliGRAM(s) SubCutaneous every 24 hours  hydroxychloroquine 200 milliGRAM(s) Oral every 12 hours  levothyroxine 25 MICROGram(s) Oral daily  pantoprazole    Tablet 20 milliGRAM(s) Oral before breakfast  predniSONE   Tablet 5 milliGRAM(s) Oral daily  pregabalin 100 milliGRAM(s) Oral three times a day  propranolol LA 80 milliGRAM(s) Oral daily  sulfaSALAzine 500 milliGRAM(s) Oral every 8 hours    MEDICATIONS  (PRN):  acetaminophen   Tablet .. 650 milliGRAM(s) Oral every 6 hours PRN Temp greater or equal to 38C (100.4F), Mild Pain (1 - 3)      Allergies    clindamycin (Unknown)  piperacillin (Unknown)  vancomycin (Unknown)    Intolerances        LABS:                        11.1   4.24  )-----------( 199      ( 20 Mar 2020 07:41 )             36.3     03-20    137  |  102  |  9   ----------------------------<  115<H>  3.7   |  23  |  0.62    Ca    8.5      20 Mar 2020 07:41  Phos  3.1     03-20  Mg     1.8     03-20    TPro  7.6  /  Alb  3.3  /  TBili  0.4  /  DBili  x   /  AST  63<H>  /  ALT  45  /  AlkPhos  58  03-20          RADIOLOGY & ADDITIONAL TESTS:  Reviewed **Incomplete Note**    INTERVAL HPI/OVERNIGHT EVENTS:  Patient was seen and examined at bedside. As per nurse and patient, no o/n events, patient resting comfortably. No complaints at this time. Patient denies: fever, chills, dizziness, weakness, HA, Changes in vision, CP, palpitations, SOB, cough, N/V/D/C, dysuria, changes in bowel movements, LE edema. ROS otherwise negative.    VITAL SIGNS:  T(F): 100.4 (03-21-20 @ 00:22)  HR: 75 (03-20-20 @ 21:49)  BP: 108/61 (03-20-20 @ 21:49)  RR: 19 (03-20-20 @ 21:49)  SpO2: 94% (03-20-20 @ 21:49)  Wt(kg): --      03-20-20 @ 07:01  -  03-21-20 @ 06:32  --------------------------------------------------------  IN: 0 mL / OUT: 550 mL / NET: -550 mL        PHYSICAL EXAM:      General: in NAD, lying comfortably in bed, non-ill appearing, a bit diaphoretic   HEENT: normocephalic, atraumatic; PERRL, anicteric sclera; dry tongue   Neck: supple, no thyromegaly, no lymphadenopathy  Cardiovascular: couldn't auscultate heart sounds today 2/2 to habitus   Respiratory: Breathing comfortably, no accessory muscle use; diffuse end expiratory wheezes in all lung fields  Extremities: WWP; joint deformities of hands and feet   Vascular: 2+ radial BL, 2+ DP BL  Neurological: Alert to person place and time     MEDICATIONS  (STANDING):  aspirin enteric coated 81 milliGRAM(s) Oral daily  citalopram 40 milliGRAM(s) Oral daily  enoxaparin Injectable 40 milliGRAM(s) SubCutaneous every 24 hours  hydroxychloroquine 200 milliGRAM(s) Oral every 12 hours  levothyroxine 25 MICROGram(s) Oral daily  pantoprazole    Tablet 20 milliGRAM(s) Oral before breakfast  predniSONE   Tablet 5 milliGRAM(s) Oral daily  pregabalin 100 milliGRAM(s) Oral three times a day  propranolol LA 80 milliGRAM(s) Oral daily  sulfaSALAzine 500 milliGRAM(s) Oral every 8 hours    MEDICATIONS  (PRN):  acetaminophen   Tablet .. 650 milliGRAM(s) Oral every 6 hours PRN Temp greater or equal to 38C (100.4F), Mild Pain (1 - 3)      Allergies    clindamycin (Unknown)  piperacillin (Unknown)  vancomycin (Unknown)    Intolerances        LABS:                        11.1   4.24  )-----------( 199      ( 20 Mar 2020 07:41 )             36.3     03-20    137  |  102  |  9   ----------------------------<  115<H>  3.7   |  23  |  0.62    Ca    8.5      20 Mar 2020 07:41  Phos  3.1     03-20  Mg     1.8     03-20    TPro  7.6  /  Alb  3.3  /  TBili  0.4  /  DBili  x   /  AST  63<H>  /  ALT  45  /  AlkPhos  58  03-20          RADIOLOGY & ADDITIONAL TESTS:  Reviewed **Incomplete Note**    TRANSFER NOTE FROM [facility] TO [facility] OBSERVATIONAL UNIT ([facility])     Hospital Course:     INTERVAL HPI/OVERNIGHT EVENTS:  Patient was seen and examined at bedside. As per nurse and patient, no o/n events, patient resting comfortably. No complaints at this time. Patient denies: fever, chills, dizziness, weakness, HA, Changes in vision, CP, palpitations, SOB, cough, N/V/D/C, dysuria, changes in bowel movements, LE edema. ROS otherwise negative.    VITAL SIGNS:  T(F): 100.4 (03-21-20 @ 00:22)  HR: 75 (03-20-20 @ 21:49)  BP: 108/61 (03-20-20 @ 21:49)  RR: 19 (03-20-20 @ 21:49)  SpO2: 94% (03-20-20 @ 21:49)  Wt(kg): --      03-20-20 @ 07:01  -  03-21-20 @ 06:32  --------------------------------------------------------  IN: 0 mL / OUT: 550 mL / NET: -550 mL        PHYSICAL EXAM:      General: in NAD, lying comfortably in bed, non-ill appearing, a bit diaphoretic   HEENT: normocephalic, atraumatic; PERRL, anicteric sclera; dry tongue   Neck: supple, no thyromegaly, no lymphadenopathy  Cardiovascular: couldn't auscultate heart sounds today 2/2 to habitus   Respiratory: Breathing comfortably, no accessory muscle use; diffuse end expiratory wheezes in all lung fields  Extremities: WWP; joint deformities of hands and feet   Vascular: 2+ radial BL, 2+ DP BL  Neurological: Alert to person place and time     MEDICATIONS  (STANDING):  aspirin enteric coated 81 milliGRAM(s) Oral daily  citalopram 40 milliGRAM(s) Oral daily  enoxaparin Injectable 40 milliGRAM(s) SubCutaneous every 24 hours  hydroxychloroquine 200 milliGRAM(s) Oral every 12 hours  levothyroxine 25 MICROGram(s) Oral daily  pantoprazole    Tablet 20 milliGRAM(s) Oral before breakfast  predniSONE   Tablet 5 milliGRAM(s) Oral daily  pregabalin 100 milliGRAM(s) Oral three times a day  propranolol LA 80 milliGRAM(s) Oral daily  sulfaSALAzine 500 milliGRAM(s) Oral every 8 hours    MEDICATIONS  (PRN):  acetaminophen   Tablet .. 650 milliGRAM(s) Oral every 6 hours PRN Temp greater or equal to 38C (100.4F), Mild Pain (1 - 3)      Allergies    clindamycin (Unknown)  piperacillin (Unknown)  vancomycin (Unknown)    Intolerances        LABS:                        11.1   4.24  )-----------( 199      ( 20 Mar 2020 07:41 )             36.3     03-20    137  |  102  |  9   ----------------------------<  115<H>  3.7   |  23  |  0.62    Ca    8.5      20 Mar 2020 07:41  Phos  3.1     03-20  Mg     1.8     03-20    TPro  7.6  /  Alb  3.3  /  TBili  0.4  /  DBili  x   /  AST  63<H>  /  ALT  45  /  AlkPhos  58  03-20          RADIOLOGY & ADDITIONAL TESTS:  Reviewed **Incomplete Note**    TRANSFER NOTE FROM [facility] TO [facility] OBSERVATIONAL UNIT ([facility])     Hospital Course:  65 y/o F with rheumatoid arthritis, fibromyalgia admitted from [facility] rehab with fevers and resolved dry cough in setting of known numerous COVID+ residents at facility. Labs significant for lymphopenia and elevated inflammatory markers (CRP, LDH, ferritin).  RVP and urine legionella negative. CXR demonstrating right basilar opacity and bibasilar focal atelectasis, with obscured right cardiac border. Patient found to be COVID+, but also treated fro CAP with ceftriaxone and azithromycin. Patient is medically optimized and  hemodynamically stable and safe for discharge to [facility] pending 2 negative COVID-19 tests (retest for COVID-19 on 3/22/20). Transferred to [facility] observational unit ([facility]) for further care.       INTERVAL HPI/OVERNIGHT EVENTS:  Patient was seen and examined at bedside. As per nurse and patient, no o/n events, patient resting comfortably. No complaints at this time. Patient denies: fever, chills, dizziness, weakness, HA, Changes in vision, CP, palpitations, SOB, cough, N/V/D/C, dysuria, changes in bowel movements, LE edema. ROS otherwise negative.    VITAL SIGNS:  T(F): 100.4 (03-21-20 @ 00:22)  HR: 75 (03-20-20 @ 21:49)  BP: 108/61 (03-20-20 @ 21:49)  RR: 19 (03-20-20 @ 21:49)  SpO2: 94% (03-20-20 @ 21:49)  Wt(kg): --      03-20-20 @ 07:01  -  03-21-20 @ 06:32  --------------------------------------------------------  IN: 0 mL / OUT: 550 mL / NET: -550 mL        PHYSICAL EXAM:      General: in NAD, lying comfortably in bed, non-ill appearing, a bit diaphoretic   HEENT: normocephalic, atraumatic; PERRL, anicteric sclera; dry tongue   Neck: supple, no thyromegaly, no lymphadenopathy  Cardiovascular: couldn't auscultate heart sounds today 2/2 to habitus   Respiratory: Breathing comfortably, no accessory muscle use; diffuse end expiratory wheezes in all lung fields  Extremities: WWP; joint deformities of hands and feet   Vascular: 2+ radial BL, 2+ DP BL  Neurological: Alert to person place and time     MEDICATIONS  (STANDING):  aspirin enteric coated 81 milliGRAM(s) Oral daily  citalopram 40 milliGRAM(s) Oral daily  enoxaparin Injectable 40 milliGRAM(s) SubCutaneous every 24 hours  hydroxychloroquine 200 milliGRAM(s) Oral every 12 hours  levothyroxine 25 MICROGram(s) Oral daily  pantoprazole    Tablet 20 milliGRAM(s) Oral before breakfast  predniSONE   Tablet 5 milliGRAM(s) Oral daily  pregabalin 100 milliGRAM(s) Oral three times a day  propranolol LA 80 milliGRAM(s) Oral daily  sulfaSALAzine 500 milliGRAM(s) Oral every 8 hours    MEDICATIONS  (PRN):  acetaminophen   Tablet .. 650 milliGRAM(s) Oral every 6 hours PRN Temp greater or equal to 38C (100.4F), Mild Pain (1 - 3)      Allergies    clindamycin (Unknown)  piperacillin (Unknown)  vancomycin (Unknown)    Intolerances        LABS:                        11.1   4.24  )-----------( 199      ( 20 Mar 2020 07:41 )             36.3     03-20    137  |  102  |  9   ----------------------------<  115<H>  3.7   |  23  |  0.62    Ca    8.5      20 Mar 2020 07:41  Phos  3.1     03-20  Mg     1.8     03-20    TPro  7.6  /  Alb  3.3  /  TBili  0.4  /  DBili  x   /  AST  63<H>  /  ALT  45  /  AlkPhos  58  03-20          RADIOLOGY & ADDITIONAL TESTS:  Reviewed Hospital Course:  65 y/o F with rheumatoid arthritis, fibromyalgia admitted from [facility] rehab with fevers and resolved dry cough in setting of known numerous COVID+ residents at facility. Labs significant for lymphopenia and elevated inflammatory markers (CRP, LDH, ferritin).  RVP and urine legionella negative. CXR demonstrating right basilar opacity and bibasilar focal atelectasis, with obscured right cardiac border. Patient found to be COVID+, but also treated fro CAP with ceftriaxone and azithromycin. Patient is medically optimized and  hemodynamically stable and safe for discharge to [facility] pending 2 negative COVID-19 tests (retest for COVID-19 on 3/22/20). Transferred to [facility] observational unit ([facility]) for further care.       INTERVAL HPI/OVERNIGHT EVENTS:  Patient was seen and examined at bedside. As per nurse and patient, no o/n events, patient resting comfortably. No complaints at this time. Patient denies: fever, chills, dizziness, weakness, HA, Changes in vision, CP, palpitations, SOB, cough, N/V/D/C, dysuria, changes in bowel movements, LE edema. ROS otherwise negative.    VITAL SIGNS:  T(F): 100.4 (03-21-20 @ 00:22)  HR: 75 (03-20-20 @ 21:49)  BP: 108/61 (03-20-20 @ 21:49)  RR: 19 (03-20-20 @ 21:49)  SpO2: 94% (03-20-20 @ 21:49)  Wt(kg): --      03-20-20 @ 07:01  -  03-21-20 @ 06:32  --------------------------------------------------------  IN: 0 mL / OUT: 550 mL / NET: -550 mL    INTERVAL HPI/OVERNIGHT EVENTS:  Patient was seen and examined at bedside. As per nurse and patient, no o/n events, patient resting comfortably. No complaints at this time. Patient denies: fever, chills, dizziness, weakness, HA, Changes in vision, CP, palpitations, SOB, cough, N/V/D/C, dysuria, changes in bowel movements, LE edema. ROS otherwise negative.    VITAL SIGNS:  T(F): 99 (03-21-20 @ 13:55)  HR: 73 (03-21-20 @ 15:11)  BP: 106/64 (03-21-20 @ 15:11)  RR: 19 (03-21-20 @ 15:11)  SpO2: 94% (03-21-20 @ 15:11)  Wt(kg): --      03-20-20 @ 07:01  -  03-21-20 @ 07:00  --------------------------------------------------------  IN: 0 mL / OUT: 550 mL / NET: -550 mL        PHYSICAL EXAM:    Constitutional: Obese women laying in bed mildly diaphoretic, tachypneic to 28 (manually calculated), NAD  HEENT: NC/AT, PERRL, EOMI, anicteric sclera, no nasal discharge; uvula midline, no oropharyngeal erythema or exudates; MMM  Neck: supple; no JVD or thyromegaly  Respiratory: LLB inspiratory crackles, decreased breath sounds in Left middle lung field, decreased breath sounds RLL; no W/R/R, no retractions  Cardiac: +S1/S2; RRR; no M/R/G; PMI non-displaced  Gastrointestinal: soft, NT/ND; no rebound or guarding; +BSx4  Genitourinary: normal external genitalia  Extremities: Contracted/deformed hands 2/2 RA, b/l toe deformities  Musculoskeletal: NROM x4; no joint swelling, tenderness or erythema  Vascular: 2+ radial, DP/PT pulses B/L  Dermatologic: skin warm, dry and intact; no rashes, wounds, or scars  Lymphatic: no submandibular or cervical LAD  Neurologic: AAOx3; CNII-XII grossly intact; no focal deficits      MEDICATIONS  (STANDING):  ascorbic acid 500 milliGRAM(s) Oral daily  aspirin enteric coated 81 milliGRAM(s) Oral daily  azithromycin   Tablet 250 milliGRAM(s) Oral every 24 hours  citalopram 40 milliGRAM(s) Oral daily  enoxaparin Injectable 40 milliGRAM(s) SubCutaneous every 24 hours  hydroxychloroquine 200 milliGRAM(s) Oral every 12 hours  levothyroxine 25 MICROGram(s) Oral daily  pantoprazole    Tablet 20 milliGRAM(s) Oral before breakfast  predniSONE   Tablet 5 milliGRAM(s) Oral daily  pregabalin 100 milliGRAM(s) Oral three times a day  propranolol LA 80 milliGRAM(s) Oral daily  sulfaSALAzine 500 milliGRAM(s) Oral every 8 hours  thiamine 100 milliGRAM(s) Oral every 24 hours    MEDICATIONS  (PRN):  acetaminophen   Tablet .. 650 milliGRAM(s) Oral every 6 hours PRN Temp greater or equal to 38C (100.4F), Mild Pain (1 - 3)      Allergies    clindamycin (Unknown)  piperacillin (Unknown)  vancomycin (Unknown)    Intolerances        LABS:                        11.0   4.12  )-----------( 214      ( 21 Mar 2020 07:38 )             35.8     03-21    137  |  102  |  10  ----------------------------<  88  3.9   |  23  |  0.56    Ca    8.7      21 Mar 2020 07:38  Phos  3.0     03-21  Mg     1.8     03-21    TPro  7.5  /  Alb  3.1<L>  /  TBili  0.4  /  DBili  x   /  AST  57<H>  /  ALT  38  /  AlkPhos  56  03-21          RADIOLOGY & ADDITIONAL TESTS:  Reviewed    MEDICATIONS  (STANDING):  aspirin enteric coated 81 milliGRAM(s) Oral daily  citalopram 40 milliGRAM(s) Oral daily  enoxaparin Injectable 40 milliGRAM(s) SubCutaneous every 24 hours  hydroxychloroquine 200 milliGRAM(s) Oral every 12 hours  levothyroxine 25 MICROGram(s) Oral daily  pantoprazole    Tablet 20 milliGRAM(s) Oral before breakfast  predniSONE   Tablet 5 milliGRAM(s) Oral daily  pregabalin 100 milliGRAM(s) Oral three times a day  propranolol LA 80 milliGRAM(s) Oral daily  sulfaSALAzine 500 milliGRAM(s) Oral every 8 hours    MEDICATIONS  (PRN):  acetaminophen   Tablet .. 650 milliGRAM(s) Oral every 6 hours PRN Temp greater or equal to 38C (100.4F), Mild Pain (1 - 3)      Allergies    clindamycin (Unknown)  piperacillin (Unknown)  vancomycin (Unknown)    Intolerances        LABS:                        11.1   4.24  )-----------( 199      ( 20 Mar 2020 07:41 )             36.3     03-20    137  |  102  |  9   ----------------------------<  115<H>  3.7   |  23  |  0.62    Ca    8.5      20 Mar 2020 07:41  Phos  3.1     03-20  Mg     1.8     03-20    TPro  7.6  /  Alb  3.3  /  TBili  0.4  /  DBili  x   /  AST  63<H>  /  ALT  45  /  AlkPhos  58  03-20          RADIOLOGY & ADDITIONAL TESTS:  Reviewed Hospital Course:  65 y/o F with rheumatoid arthritis, fibromyalgia admitted from [facility] rehab with fevers and resolved dry cough in setting of known numerous COVID+ residents at facility. Labs significant for lymphopenia and elevated inflammatory markers (CRP, LDH, ferritin).  RVP and urine legionella negative. CXR demonstrating right basilar opacity and bibasilar focal atelectasis, with obscured right cardiac border. Patient found to be COVID+, but also treated fro CAP with ceftriaxone and azithromycin. Patient is medically optimized and  hemodynamically stable and safe for discharge to [facility] pending 2 negative COVID-19 tests (retested for COVID-19 on 3/22/20). Transferred to [facility] observational unit ([facility]) for further care.       INTERVAL HPI/OVERNIGHT EVENTS:  Patient was seen and examined at bedside. As per nurse and patient, no o/n events, patient resting comfortably. No complaints at this time. Patient denies: fever, chills, dizziness, weakness, HA, Changes in vision, CP, palpitations, SOB, cough, N/V/D/C, dysuria, changes in bowel movements, LE edema. ROS otherwise negative.    VITAL SIGNS:  T(F): 100.4 (03-21-20 @ 00:22)  HR: 75 (03-20-20 @ 21:49)  BP: 108/61 (03-20-20 @ 21:49)  RR: 19 (03-20-20 @ 21:49)  SpO2: 94% (03-20-20 @ 21:49)  Wt(kg): --      03-20-20 @ 07:01  -  03-21-20 @ 06:32  --------------------------------------------------------  IN: 0 mL / OUT: 550 mL / NET: -550 mL    INTERVAL HPI/OVERNIGHT EVENTS:  Patient was seen and examined at bedside. As per nurse and patient, no o/n events, patient resting comfortably. No complaints at this time. Patient denies: fever, chills, dizziness, weakness, HA, Changes in vision, CP, palpitations, SOB, cough, N/V/D/C, dysuria, changes in bowel movements, LE edema. ROS otherwise negative.    VITAL SIGNS:  T(F): 99 (03-21-20 @ 13:55)  HR: 73 (03-21-20 @ 15:11)  BP: 106/64 (03-21-20 @ 15:11)  RR: 19 (03-21-20 @ 15:11)  SpO2: 94% (03-21-20 @ 15:11)  Wt(kg): --      03-20-20 @ 07:01  -  03-21-20 @ 07:00  --------------------------------------------------------  IN: 0 mL / OUT: 550 mL / NET: -550 mL        PHYSICAL EXAM:    Constitutional: Obese women laying in bed mildly diaphoretic, tachypneic to 28 (manually calculated), NAD  HEENT: NC/AT, PERRL, EOMI, anicteric sclera, no nasal discharge; uvula midline, no oropharyngeal erythema or exudates; MMM  Neck: supple; no JVD or thyromegaly  Respiratory: LLB inspiratory crackles, decreased breath sounds in Left middle lung field, decreased breath sounds RLL; no W/R/R, no retractions  Cardiac: +S1/S2; RRR; no M/R/G; PMI non-displaced  Gastrointestinal: soft, NT/ND; no rebound or guarding; +BSx4  Genitourinary: normal external genitalia  Extremities: Contracted/deformed hands 2/2 RA, b/l toe deformities  Musculoskeletal: NROM x4; no joint swelling, tenderness or erythema  Vascular: 2+ radial, DP/PT pulses B/L  Dermatologic: skin warm, dry and intact; no rashes, wounds, or scars  Lymphatic: no submandibular or cervical LAD  Neurologic: AAOx3; CNII-XII grossly intact; no focal deficits      MEDICATIONS  (STANDING):  ascorbic acid 500 milliGRAM(s) Oral daily  aspirin enteric coated 81 milliGRAM(s) Oral daily  azithromycin   Tablet 250 milliGRAM(s) Oral every 24 hours  citalopram 40 milliGRAM(s) Oral daily  enoxaparin Injectable 40 milliGRAM(s) SubCutaneous every 24 hours  hydroxychloroquine 200 milliGRAM(s) Oral every 12 hours  levothyroxine 25 MICROGram(s) Oral daily  pantoprazole    Tablet 20 milliGRAM(s) Oral before breakfast  predniSONE   Tablet 5 milliGRAM(s) Oral daily  pregabalin 100 milliGRAM(s) Oral three times a day  propranolol LA 80 milliGRAM(s) Oral daily  sulfaSALAzine 500 milliGRAM(s) Oral every 8 hours  thiamine 100 milliGRAM(s) Oral every 24 hours    MEDICATIONS  (PRN):  acetaminophen   Tablet .. 650 milliGRAM(s) Oral every 6 hours PRN Temp greater or equal to 38C (100.4F), Mild Pain (1 - 3)      Allergies    clindamycin (Unknown)  piperacillin (Unknown)  vancomycin (Unknown)    Intolerances        LABS:                        11.0   4.12  )-----------( 214      ( 21 Mar 2020 07:38 )             35.8     03-21    137  |  102  |  10  ----------------------------<  88  3.9   |  23  |  0.56    Ca    8.7      21 Mar 2020 07:38  Phos  3.0     03-21  Mg     1.8     03-21    TPro  7.5  /  Alb  3.1<L>  /  TBili  0.4  /  DBili  x   /  AST  57<H>  /  ALT  38  /  AlkPhos  56  03-21          RADIOLOGY & ADDITIONAL TESTS:  Reviewed    MEDICATIONS  (STANDING):  aspirin enteric coated 81 milliGRAM(s) Oral daily  citalopram 40 milliGRAM(s) Oral daily  enoxaparin Injectable 40 milliGRAM(s) SubCutaneous every 24 hours  hydroxychloroquine 200 milliGRAM(s) Oral every 12 hours  levothyroxine 25 MICROGram(s) Oral daily  pantoprazole    Tablet 20 milliGRAM(s) Oral before breakfast  predniSONE   Tablet 5 milliGRAM(s) Oral daily  pregabalin 100 milliGRAM(s) Oral three times a day  propranolol LA 80 milliGRAM(s) Oral daily  sulfaSALAzine 500 milliGRAM(s) Oral every 8 hours    MEDICATIONS  (PRN):  acetaminophen   Tablet .. 650 milliGRAM(s) Oral every 6 hours PRN Temp greater or equal to 38C (100.4F), Mild Pain (1 - 3)      Allergies    clindamycin (Unknown)  piperacillin (Unknown)  vancomycin (Unknown)    Intolerances        LABS:                        11.1   4.24  )-----------( 199      ( 20 Mar 2020 07:41 )             36.3     03-20    137  |  102  |  9   ----------------------------<  115<H>  3.7   |  23  |  0.62    Ca    8.5      20 Mar 2020 07:41  Phos  3.1     03-20  Mg     1.8     03-20    TPro  7.6  /  Alb  3.3  /  TBili  0.4  /  DBili  x   /  AST  63<H>  /  ALT  45  /  AlkPhos  58  03-20          RADIOLOGY & ADDITIONAL TESTS:  Reviewed

## 2020-03-21 NOTE — PROGRESS NOTE ADULT - ASSESSMENT
63yo F with rheumatoid arthritis, fibromyalgia, presents from [facility] complaining of a 4-5 day of subjective fevers and a dry cough (resolved approximately 2 days ago). Now COVID-19 positive.

## 2020-03-22 LAB
ANION GAP SERPL CALC-SCNC: 14 MMOL/L — SIGNIFICANT CHANGE UP (ref 5–17)
BASOPHILS # BLD AUTO: 0.02 K/UL — SIGNIFICANT CHANGE UP (ref 0–0.2)
BASOPHILS NFR BLD AUTO: 0.4 % — SIGNIFICANT CHANGE UP (ref 0–2)
BUN SERPL-MCNC: 13 MG/DL — SIGNIFICANT CHANGE UP (ref 7–23)
CALCIUM SERPL-MCNC: 9 MG/DL — SIGNIFICANT CHANGE UP (ref 8.4–10.5)
CHLORIDE SERPL-SCNC: 99 MMOL/L — SIGNIFICANT CHANGE UP (ref 96–108)
CO2 SERPL-SCNC: 21 MMOL/L — LOW (ref 22–31)
CREAT SERPL-MCNC: 0.53 MG/DL — SIGNIFICANT CHANGE UP (ref 0.5–1.3)
CULTURE RESULTS: NO GROWTH — SIGNIFICANT CHANGE UP
CULTURE RESULTS: SIGNIFICANT CHANGE UP
EOSINOPHIL # BLD AUTO: 0.09 K/UL — SIGNIFICANT CHANGE UP (ref 0–0.5)
EOSINOPHIL NFR BLD AUTO: 1.9 % — SIGNIFICANT CHANGE UP (ref 0–6)
GLUCOSE SERPL-MCNC: 85 MG/DL — SIGNIFICANT CHANGE UP (ref 70–99)
HCT VFR BLD CALC: 35 % — SIGNIFICANT CHANGE UP (ref 34.5–45)
HGB BLD-MCNC: 10.8 G/DL — LOW (ref 11.5–15.5)
IMM GRANULOCYTES NFR BLD AUTO: 0.6 % — SIGNIFICANT CHANGE UP (ref 0–1.5)
LYMPHOCYTES # BLD AUTO: 0.79 K/UL — LOW (ref 1–3.3)
LYMPHOCYTES # BLD AUTO: 17 % — SIGNIFICANT CHANGE UP (ref 13–44)
MCHC RBC-ENTMCNC: 25.2 PG — LOW (ref 27–34)
MCHC RBC-ENTMCNC: 30.9 GM/DL — LOW (ref 32–36)
MCV RBC AUTO: 81.6 FL — SIGNIFICANT CHANGE UP (ref 80–100)
MONOCYTES # BLD AUTO: 0.58 K/UL — SIGNIFICANT CHANGE UP (ref 0–0.9)
MONOCYTES NFR BLD AUTO: 12.4 % — SIGNIFICANT CHANGE UP (ref 2–14)
NEUTROPHILS # BLD AUTO: 3.15 K/UL — SIGNIFICANT CHANGE UP (ref 1.8–7.4)
NEUTROPHILS NFR BLD AUTO: 67.7 % — SIGNIFICANT CHANGE UP (ref 43–77)
NRBC # BLD: 0 /100 WBCS — SIGNIFICANT CHANGE UP (ref 0–0)
PLATELET # BLD AUTO: 229 K/UL — SIGNIFICANT CHANGE UP (ref 150–400)
POTASSIUM SERPL-MCNC: 3.9 MMOL/L — SIGNIFICANT CHANGE UP (ref 3.5–5.3)
POTASSIUM SERPL-SCNC: 3.9 MMOL/L — SIGNIFICANT CHANGE UP (ref 3.5–5.3)
RBC # BLD: 4.29 M/UL — SIGNIFICANT CHANGE UP (ref 3.8–5.2)
RBC # FLD: 17.6 % — HIGH (ref 10.3–14.5)
SODIUM SERPL-SCNC: 134 MMOL/L — LOW (ref 135–145)
SPECIMEN SOURCE: SIGNIFICANT CHANGE UP
SPECIMEN SOURCE: SIGNIFICANT CHANGE UP
WBC # BLD: 4.66 K/UL — SIGNIFICANT CHANGE UP (ref 3.8–10.5)
WBC # FLD AUTO: 4.66 K/UL — SIGNIFICANT CHANGE UP (ref 3.8–10.5)

## 2020-03-22 PROCEDURE — 99233 SBSQ HOSP IP/OBS HIGH 50: CPT | Mod: GC

## 2020-03-22 RX ORDER — THIAMINE MONONITRATE (VIT B1) 100 MG
200 TABLET ORAL
Refills: 0 | Status: DISCONTINUED | OUTPATIENT
Start: 2020-03-22 | End: 2020-03-25

## 2020-03-22 RX ADMIN — AZITHROMYCIN 250 MILLIGRAM(S): 500 TABLET, FILM COATED ORAL at 11:37

## 2020-03-22 RX ADMIN — Medication 5 MILLIGRAM(S): at 07:18

## 2020-03-22 RX ADMIN — Medication 100 MILLIGRAM(S): at 07:17

## 2020-03-22 RX ADMIN — Medication 25 MICROGRAM(S): at 07:19

## 2020-03-22 RX ADMIN — CITALOPRAM 40 MILLIGRAM(S): 10 TABLET, FILM COATED ORAL at 11:36

## 2020-03-22 RX ADMIN — Medication 100 MILLIGRAM(S): at 14:46

## 2020-03-22 RX ADMIN — Medication 500 MILLIGRAM(S): at 22:07

## 2020-03-22 RX ADMIN — Medication 80 MILLIGRAM(S): at 07:18

## 2020-03-22 RX ADMIN — PANTOPRAZOLE SODIUM 20 MILLIGRAM(S): 20 TABLET, DELAYED RELEASE ORAL at 07:19

## 2020-03-22 RX ADMIN — Medication 102 MILLIGRAM(S): at 18:55

## 2020-03-22 RX ADMIN — Medication 500 MILLIGRAM(S): at 07:20

## 2020-03-22 RX ADMIN — Medication 500 MILLIGRAM(S): at 14:45

## 2020-03-22 RX ADMIN — Medication 200 MILLIGRAM(S): at 18:55

## 2020-03-22 RX ADMIN — Medication 200 MILLIGRAM(S): at 07:18

## 2020-03-22 RX ADMIN — ENOXAPARIN SODIUM 40 MILLIGRAM(S): 100 INJECTION SUBCUTANEOUS at 11:36

## 2020-03-22 RX ADMIN — Medication 100 MILLIGRAM(S): at 22:07

## 2020-03-22 RX ADMIN — Medication 81 MILLIGRAM(S): at 11:36

## 2020-03-22 NOTE — DISCHARGE NOTE PROVIDER - HOSPITAL COURSE
*********** INCOMPLETE NOTE **************        63 y/o F with rheumatoid arthritis, fibromyalgia admitted from [facility] rehab with fevers and resolved dry cough in setting of known numerous COVID+ residents at facility, found to be COVID 19+ on 3/27. Patient is medically optimized and  hemodynamically stable and safe for discharge to [facility] pending 2 negative COVID-19 tests (retested for COVID-19 on 3/22/20).              Problem List/Main Diagnoses (system-based):         #SARS- COVID19    Admitted from [facility] rehab with fevers and resolved dry cough in setting of known numerous COVID+ residents at facility, found to be COVID 19+ on 3/17. Restested for COVID-19 on 3/22. atient is medically optimized and  hemodynamically stable and safe for discharge to [facility] pending 2 negative COVID-19 tests        Inpatient treatment course:     Azithromycin 250 mg daily, Plaquenil 200mg BID         New medications:             Exam to be followed outpatient:     Annual physical exam with outpatient PCP *********** INCOMPLETE NOTE **************        65 y/o F with rheumatoid arthritis, fibromyalgia admitted from [facility] rehab with fevers and resolved dry cough in setting of known numerous COVID+ residents at facility, found to be COVID 19+ on 3/27. Patient is medically optimized and  hemodynamically stable and safe for discharge to [facility] pending 2 negative COVID-19 tests (retested for COVID-19 on 3/22/20).              Problem List/Main Diagnoses (system-based):         #SARS- COVID19    Admitted from [facility] rehab with fevers and resolved dry cough in setting of known numerous COVID+ residents at facility, found to be COVID 19+ on 3/17. Restested for COVID-19 on 3/22. atient is medically optimized and  hemodynamically stable and safe for discharge to [facility] pending 2 negative COVID-19 tests        Inpatient treatment course:     Azithromycin 250 mg daily, Plaquenil 200mg BID         New medications:             Exam to be followed outpatient:    comprehensive physical exam with outpatient PCP 65 y/o F with rheumatoid arthritis, fibromyalgia admitted from [facility] rehab with fevers and resolved dry cough in setting of known numerous COVID+ residents at facility, found to be COVID 19+ on 3/27. Patient is medically optimized and  hemodynamically stable and safe for discharge to [facility]. She has been afebrile for > 72 hours. She has a small oxygen requirement via nasal cannula @ 3L.              Problem List/Main Diagnoses (system-based):         #SARS- COVID19    Admitted from [facility] rehab with fevers and resolved dry cough in setting of known numerous COVID+ residents at facility, found to be COVID 19+ on 3/17.         Inpatient treatment course: Ms. Hewitt was admitted and treated for CAP with ceftriaxone and azithromycin. For COVID, the patient already takes plaquenil for her RA and azithromycin was already on board for CAP. Throughout her stay she has been asymptomatic. She has required nasal cannula to maintain her o2 sat > 90%. These requirements have been small (around 2L). Patient was medically optimized, stable and ready for discharge. Plan of care and return precautions were discussed with the patient who verbally stated understanding. 63 y/o F with rheumatoid arthritis, fibromyalgia admitted from [facility] rehab with fevers and resolved dry cough in setting of known numerous COVID+ residents at facility, found to be COVID 19+ on 3/27. Patient is medically optimized and  hemodynamically stable and safe for discharge to [facility]. She has been afebrile for > 72 hours. Patient weened off oxygen and is 95% on room air.             Problem List/Main Diagnoses (system-based):         #SARS- COVID19    Admitted from [facility] rehab with fevers and resolved dry cough in setting of known numerous COVID+ residents at facility, found to be COVID 19+ on 3/17.         Inpatient treatment course: Ms. Hewitt was admitted and treated for CAP with ceftriaxone and azithromycin. For COVID, the patient already takes plaquenil for her RA and azithromycin was already on board for CAP. Throughout her stay she has been asymptomatic. She has required nasal cannula to maintain her o2 sat > 90%. These requirements have been small (around 2L). Patient was medically optimized, stable and ready for discharge. Plan of care and return precautions were discussed with the patient who verbally stated understanding.

## 2020-03-22 NOTE — PROGRESS NOTE ADULT - ASSESSMENT
65yo F with rheumatoid arthritis, fibromyalgia, presents from [facility] complaining of a 4-5 day of subjective fevers and a dry cough (resolved approximately 2 days ago). Now COVID-19 positive.

## 2020-03-22 NOTE — PROGRESS NOTE ADULT - PROBLEM SELECTOR PLAN 1
Improved clinically - now with decreasing O2 requirements and normal RR  - Ferritin normal  - COVID +  - Legionella negative   -Will hold in COVID unit for o/n respiratory status monitoring

## 2020-03-22 NOTE — DISCHARGE NOTE PROVIDER - NSDCCPCAREPLAN_GEN_ALL_CORE_FT
PRINCIPAL DISCHARGE DIAGNOSIS  Diagnosis: SARS-associated coronavirus infection  Assessment and Plan of Treatment:       SECONDARY DISCHARGE DIAGNOSES  Diagnosis: Community acquired pneumonia  Assessment and Plan of Treatment: PRINCIPAL DISCHARGE DIAGNOSIS  Diagnosis: Infection due to 2019 novel coronavirus  Assessment and Plan of Treatment:   You were found to have the novel new coronavirus, also known as COVID-19. This test resulted from a nasal and oral (mouth) swab that was done earlier in your admission to [facility]. Your symptoms improved dramatically during your hospital stay. Since your test result was positive, the treatment is supportive care, which means: rest, hydration, and maintaining a good healthy diet. You may take Tylenol if you experience any fevers and Robitussin for cough.   If you start experiencing extreme shortness of breath, difficulty breathing resulting in the inability to even walk, please visit an urgent care. Please maintain a 2 week (14 day) quarantine in your apartment until a health care agent calls you with the test results. The department of health will follow-up with you via phone, please do not go to your PCP while in self quarantine. Wear a mask at all times should you have to be outdoors briefly - and avoid crowds, public spaces, and mass transit at all times during this quarantine. Continue to wash your hands thoroughly and frequently. Please see the supplemented written instructions for further use.      SECONDARY DISCHARGE DIAGNOSES  Diagnosis: Community acquired pneumonia  Assessment and Plan of Treatment: Pneumonia is an infection of the lungs. Pneumonia may be caused by bacteria, viruses, or funguses. Symptoms include coughing, fever, chest pain when breathing deeply or coughing, shortness of breath, fatigue, or muscle aches. Pneumonia can be diagnosed with a medical history and physical exam, as well as other tests which may include a chest X-ray. If you were prescribed an antibiotic medicine, take it as told by your health care provider and do not stop taking the antibiotic even if you start to feel better. Do not use tobacco products, including cigarettes, chewing tobacco, and e-cigarettes.  SEEK IMMEDIATE MEDICAL CARE IF YOU HAVE ANY OF THE FOLLOWING SYMPTOMS: worsening shortness of breath, worsening chest pain, coughing up blood, change in mental status, lightheadedness/dizziness. PRINCIPAL DISCHARGE DIAGNOSIS  Diagnosis: Infection due to 2019 novel coronavirus  Assessment and Plan of Treatment: You were found to have the novel new coronavirus, also known as COVID-19. This test resulted from a nasal and oral (mouth) swab that was done earlier in your admission to [facility]. Your symptoms improved during your hospital stay. Since your test result was positive, the treatment is supportive care, which means: rest, hydration, and maintaining a good healthy diet. You may take Tylenol if you experience any fevers and Robitussin for cough.   If you start experiencing extreme shortness of breath, difficulty breathing resulting in the inability to even walk, please visit an urgent care. Please maintain a 2 week (14 day) quarantine in your apartment until a health care agent calls you with the test results. The department of health will follow-up with you via phone, please do not go to your PCP while in self quarantine. Wear a mask at all times should you have to be outdoors briefly - and avoid crowds, public spaces, and mass transit at all times during this quarantine. Continue to wash your hands thoroughly and frequently. Please see the supplemented written instructions for further use.      SECONDARY DISCHARGE DIAGNOSES  Diagnosis: Community acquired pneumonia  Assessment and Plan of Treatment: Pneumonia is an infection of the lungs. Pneumonia may be caused by bacteria, viruses, or funguses. Symptoms include coughing, fever, chest pain when breathing deeply or coughing, shortness of breath, fatigue, or muscle aches. Pneumonia can be diagnosed with a medical history and physical exam, as well as other tests which may include a chest X-ray. If you were prescribed an antibiotic medicine, take it as told by your health care provider and do not stop taking the antibiotic even if you start to feel better. Do not use tobacco products, including cigarettes, chewing tobacco, and e-cigarettes.  SEEK IMMEDIATE MEDICAL CARE IF YOU HAVE ANY OF THE FOLLOWING SYMPTOMS: worsening shortness of breath, worsening chest pain, coughing up blood, change in mental status, lightheadedness/dizziness.  For your information; you were treated with antibiotics during this admission for your pneumonia (ceftriaxone and azithromycin).

## 2020-03-22 NOTE — DISCHARGE NOTE PROVIDER - NSDCMRMEDTOKEN_GEN_ALL_CORE_FT
aspirin 81 mg oral tablet: 1 tab(s) orally once a day  citalopram 40 mg oral tablet: 1 tab(s) orally once a day  Fosamax 70 mg oral tablet: 1 tab(s) orally once a week  hydroxychloroquine 200 mg oral tablet: orally 2 times a day  magnesium oxide 400 mg (240 mg elemental magnesium) oral tablet: 1 tab(s) orally once a day  pantoprazole 20 mg oral delayed release tablet:   predniSONE 5 mg oral tablet: 1 tab(s) orally once a day  pregabalin 100 mg oral capsule:   PropranoloL Hydrochloride ER:   sulfaSALAzine 500 mg oral tablet:   Synthroid: aspirin 81 mg oral tablet: 1 tab(s) orally once a day  citalopram 40 mg oral tablet: 1 tab(s) orally once a day  Fosamax 70 mg oral tablet: 1 tab(s) orally once a week  hydroxychloroquine 200 mg oral tablet: orally 2 times a day  magnesium oxide 400 mg (240 mg elemental magnesium) oral tablet: 1 tab(s) orally once a day  pantoprazole 20 mg oral delayed release tablet:   predniSONE 5 mg oral tablet: 1 tab(s) orally once a day  pregabalin 100 mg oral capsule:   PropranoloL Hydrochloride ER:   sulfaSALAzine 500 mg oral tablet:   Synthroid:   thiamine 100 mg oral tablet: 2 tab(s) orally

## 2020-03-22 NOTE — PROGRESS NOTE ADULT - SUBJECTIVE AND OBJECTIVE BOX
Hospital Course:  63 y/o F with rheumatoid arthritis, fibromyalgia admitted from [facility] rehab with fevers and resolved dry cough in setting of known numerous COVID+ residents at facility. Labs significant for lymphopenia and elevated inflammatory markers (CRP, LDH, ferritin).  RVP and urine legionella negative. CXR demonstrating right basilar opacity and bibasilar focal atelectasis, with obscured right cardiac border. Patient found to be COVID+, but also treated fro CAP with ceftriaxone and azithromycin. Patient is medically optimized and  hemodynamically stable and safe for discharge to [facility] pending 2 negative COVID-19 tests (retested for COVID-19 on 3/22/20).      INTERVAL HPI/OVERNIGHT EVENTS:  Patient was seen and examined at bedside. As per nurse and patient, no o/n events, patient resting comfortably. No complaints at this time. Patient reports feeling well. 12 pt ROS is negative.     Vital Signs Last 24 Hrs  T(C): 36.4 (22 Mar 2020 14:37), Max: 36.6 (21 Mar 2020 21:14)  T(F): 97.5 (22 Mar 2020 14:37), Max: 97.9 (21 Mar 2020 21:14)  HR: 71 (22 Mar 2020 16:02) (71 - 72)  BP: 106/65 (22 Mar 2020 14:37) (106/65 - 117/69)  BP(mean): --  RR: 17 (22 Mar 2020 16:02) (17 - 19)  SpO2: 91% (22 Mar 2020 16:02) (91% - 94%)      PHYSICAL EXAM:    Constitutional: Obese women laying in bed mildly diaphoretic, tachypneic to 28 (manually calculated), NAD  HEENT: NC/AT, PERRL, EOMI, anicteric sclera, no nasal discharge; uvula midline, no oropharyngeal erythema or exudates; MMM  Neck: supple; no JVD or thyromegaly  Respiratory: LLB inspiratory crackles, decreased breath sounds in Left middle lung field, decreased breath sounds RLL; no W/R/R, no retractions  Cardiac: +S1/S2; RRR; no M/R/G; PMI non-displaced  Gastrointestinal: soft, NT/ND; no rebound or guarding; +BSx4  Genitourinary: normal external genitalia  Extremities: Contracted/deformed hands 2/2 RA, b/l toe deformities  Musculoskeletal: NROM x4; no joint swelling, tenderness or erythema  Vascular: 2+ radial, DP/PT pulses B/L  Dermatologic: skin warm, dry and intact; no rashes, wounds, or scars  Lymphatic: no submandibular or cervical LAD  Neurologic: AAOx3; CNII-XII grossly intact; no focal deficits    MEDICATIONS  (STANDING):  aspirin enteric coated 81 milliGRAM(s) Oral daily  azithromycin   Tablet 250 milliGRAM(s) Oral every 24 hours  citalopram 40 milliGRAM(s) Oral daily  enoxaparin Injectable 40 milliGRAM(s) SubCutaneous every 24 hours  hydroxychloroquine 200 milliGRAM(s) Oral every 12 hours  levothyroxine 25 MICROGram(s) Oral daily  pantoprazole    Tablet 20 milliGRAM(s) Oral before breakfast  predniSONE   Tablet 5 milliGRAM(s) Oral daily  pregabalin 100 milliGRAM(s) Oral three times a day  propranolol LA 80 milliGRAM(s) Oral daily  sulfaSALAzine 500 milliGRAM(s) Oral every 8 hours  thiamine IVPB 200 milliGRAM(s) IV Intermittent <User Schedule>    MEDICATIONS  (PRN):  acetaminophen   Tablet .. 650 milliGRAM(s) Oral every 6 hours PRN Temp greater or equal to 38C (100.4F), Mild Pain (1 - 3)        Allergies    clindamycin (Unknown)  piperacillin (Unknown)  vancomycin (Unknown)    Intolerances        LABS:                          10.8   4.66  )-----------( 229      ( 22 Mar 2020 07:35 )             35.0     03-22    134<L>  |  99  |  13  ----------------------------<  85  3.9   |  21<L>  |  0.53    Ca    9.0      22 Mar 2020 07:35  Phos  3.0     03-21  Mg     1.8     03-21    TPro  7.5  /  Alb  3.1<L>  /  TBili  0.4  /  DBili  x   /  AST  57<H>  /  ALT  38  /  AlkPhos  56  03-21

## 2020-03-22 NOTE — DISCHARGE NOTE PROVIDER - CARE PROVIDER_API CALL
David Padilla)  Internal Medicine  [address] [address]  Phone: [phone]  Fax: [phone]  Follow Up Time:

## 2020-03-23 LAB
ALBUMIN SERPL ELPH-MCNC: 3.1 G/DL — LOW (ref 3.3–5)
ALP SERPL-CCNC: 58 U/L — SIGNIFICANT CHANGE UP (ref 40–120)
ALT FLD-CCNC: 35 U/L — SIGNIFICANT CHANGE UP (ref 10–45)
ANION GAP SERPL CALC-SCNC: 16 MMOL/L — SIGNIFICANT CHANGE UP (ref 5–17)
AST SERPL-CCNC: 54 U/L — HIGH (ref 10–40)
BASOPHILS # BLD AUTO: 0.01 K/UL — SIGNIFICANT CHANGE UP (ref 0–0.2)
BASOPHILS NFR BLD AUTO: 0.2 % — SIGNIFICANT CHANGE UP (ref 0–2)
BILIRUB SERPL-MCNC: 0.4 MG/DL — SIGNIFICANT CHANGE UP (ref 0.2–1.2)
BUN SERPL-MCNC: 13 MG/DL — SIGNIFICANT CHANGE UP (ref 7–23)
CALCIUM SERPL-MCNC: 8.9 MG/DL — SIGNIFICANT CHANGE UP (ref 8.4–10.5)
CHLORIDE SERPL-SCNC: 101 MMOL/L — SIGNIFICANT CHANGE UP (ref 96–108)
CK SERPL-CCNC: 129 U/L — SIGNIFICANT CHANGE UP (ref 25–170)
CO2 SERPL-SCNC: 21 MMOL/L — LOW (ref 22–31)
CREAT SERPL-MCNC: 0.51 MG/DL — SIGNIFICANT CHANGE UP (ref 0.5–1.3)
CRP SERPL-MCNC: 13.48 MG/DL — HIGH (ref 0–0.4)
D DIMER BLD IA.RAPID-MCNC: 292 NG/ML DDU — HIGH
EOSINOPHIL # BLD AUTO: 0.11 K/UL — SIGNIFICANT CHANGE UP (ref 0–0.5)
EOSINOPHIL NFR BLD AUTO: 2.7 % — SIGNIFICANT CHANGE UP (ref 0–6)
ERYTHROCYTE [SEDIMENTATION RATE] IN BLOOD: 88 MM/HR — HIGH
FERRITIN SERPL-MCNC: 154 NG/ML — HIGH (ref 15–150)
GLUCOSE SERPL-MCNC: 83 MG/DL — SIGNIFICANT CHANGE UP (ref 70–99)
HCT VFR BLD CALC: 36.2 % — SIGNIFICANT CHANGE UP (ref 34.5–45)
HGB BLD-MCNC: 10.9 G/DL — LOW (ref 11.5–15.5)
IMM GRANULOCYTES NFR BLD AUTO: 0.5 % — SIGNIFICANT CHANGE UP (ref 0–1.5)
LYMPHOCYTES # BLD AUTO: 0.69 K/UL — LOW (ref 1–3.3)
LYMPHOCYTES # BLD AUTO: 16.7 % — SIGNIFICANT CHANGE UP (ref 13–44)
MAGNESIUM SERPL-MCNC: 1.6 MG/DL — SIGNIFICANT CHANGE UP (ref 1.6–2.6)
MCHC RBC-ENTMCNC: 24.7 PG — LOW (ref 27–34)
MCHC RBC-ENTMCNC: 30.1 GM/DL — LOW (ref 32–36)
MCV RBC AUTO: 82.1 FL — SIGNIFICANT CHANGE UP (ref 80–100)
MONOCYTES # BLD AUTO: 0.55 K/UL — SIGNIFICANT CHANGE UP (ref 0–0.9)
MONOCYTES NFR BLD AUTO: 13.3 % — SIGNIFICANT CHANGE UP (ref 2–14)
NEUTROPHILS # BLD AUTO: 2.75 K/UL — SIGNIFICANT CHANGE UP (ref 1.8–7.4)
NEUTROPHILS NFR BLD AUTO: 66.6 % — SIGNIFICANT CHANGE UP (ref 43–77)
NRBC # BLD: 0 /100 WBCS — SIGNIFICANT CHANGE UP (ref 0–0)
NT-PROBNP SERPL-SCNC: 243 PG/ML — SIGNIFICANT CHANGE UP (ref 0–300)
PHOSPHATE SERPL-MCNC: 3.5 MG/DL — SIGNIFICANT CHANGE UP (ref 2.5–4.5)
PLATELET # BLD AUTO: 264 K/UL — SIGNIFICANT CHANGE UP (ref 150–400)
POTASSIUM SERPL-MCNC: 3.8 MMOL/L — SIGNIFICANT CHANGE UP (ref 3.5–5.3)
POTASSIUM SERPL-SCNC: 3.8 MMOL/L — SIGNIFICANT CHANGE UP (ref 3.5–5.3)
PROT SERPL-MCNC: 7.3 G/DL — SIGNIFICANT CHANGE UP (ref 6–8.3)
RBC # BLD: 4.41 M/UL — SIGNIFICANT CHANGE UP (ref 3.8–5.2)
RBC # FLD: 17.2 % — HIGH (ref 10.3–14.5)
SODIUM SERPL-SCNC: 138 MMOL/L — SIGNIFICANT CHANGE UP (ref 135–145)
TROPONIN T SERPL-MCNC: <0.01 NG/ML — SIGNIFICANT CHANGE UP (ref 0–0.01)
WBC # BLD: 4.13 K/UL — SIGNIFICANT CHANGE UP (ref 3.8–10.5)
WBC # FLD AUTO: 4.13 K/UL — SIGNIFICANT CHANGE UP (ref 3.8–10.5)

## 2020-03-23 PROCEDURE — 99233 SBSQ HOSP IP/OBS HIGH 50: CPT | Mod: GC

## 2020-03-23 RX ADMIN — AZITHROMYCIN 250 MILLIGRAM(S): 500 TABLET, FILM COATED ORAL at 11:38

## 2020-03-23 RX ADMIN — Medication 5 MILLIGRAM(S): at 08:36

## 2020-03-23 RX ADMIN — Medication 500 MILLIGRAM(S): at 08:36

## 2020-03-23 RX ADMIN — Medication 25 MICROGRAM(S): at 08:36

## 2020-03-23 RX ADMIN — Medication 100 MILLIGRAM(S): at 21:05

## 2020-03-23 RX ADMIN — ENOXAPARIN SODIUM 40 MILLIGRAM(S): 100 INJECTION SUBCUTANEOUS at 11:38

## 2020-03-23 RX ADMIN — Medication 200 MILLIGRAM(S): at 08:36

## 2020-03-23 RX ADMIN — CITALOPRAM 40 MILLIGRAM(S): 10 TABLET, FILM COATED ORAL at 11:38

## 2020-03-23 RX ADMIN — Medication 500 MILLIGRAM(S): at 21:05

## 2020-03-23 RX ADMIN — Medication 80 MILLIGRAM(S): at 08:36

## 2020-03-23 RX ADMIN — Medication 81 MILLIGRAM(S): at 11:38

## 2020-03-23 RX ADMIN — Medication 102 MILLIGRAM(S): at 18:54

## 2020-03-23 RX ADMIN — Medication 200 MILLIGRAM(S): at 18:54

## 2020-03-23 RX ADMIN — Medication 500 MILLIGRAM(S): at 16:00

## 2020-03-23 RX ADMIN — PANTOPRAZOLE SODIUM 20 MILLIGRAM(S): 20 TABLET, DELAYED RELEASE ORAL at 08:36

## 2020-03-23 RX ADMIN — Medication 100 MILLIGRAM(S): at 16:00

## 2020-03-23 RX ADMIN — Medication 100 MILLIGRAM(S): at 08:36

## 2020-03-23 RX ADMIN — Medication 102 MILLIGRAM(S): at 08:33

## 2020-03-23 NOTE — PROGRESS NOTE ADULT - SUBJECTIVE AND OBJECTIVE BOX
Hospital Course from U:     63 y/o F with rheumatoid arthritis, fibromyalgia admitted from [facility] rehab with fevers and resolved dry cough in setting of known numerous COVID+ residents at facility. Labs significant for lymphopenia and elevated inflammatory markers (CRP, LDH, ferritin).  RVP and urine legionella negative. CXR demonstrating right basilar opacity and bibasilar focal atelectasis, with obscured right cardiac border. Patient found to be COVID+, but also treated fro CAP with ceftriaxone and azithromycin. Patient is medically optimized and  hemodynamically stable and safe for discharge to [facility] pending 2 negative COVID-19 tests (retested for COVID-19 on 3/22/20).       SUBJECTIVE / INTERVAL HPI: Patient seen and examined at bedside. No acute events overnight. Patient was comfortable this morning, denies abdominal pain, SOB, chest pain. Says that she feels well.     VITAL SIGNS:  Vital Signs Last 24 Hrs  T(C): 37.3 (22 Mar 2020 22:02), Max: 37.3 (22 Mar 2020 22:02)  T(F): 99.2 (22 Mar 2020 22:02), Max: 99.2 (22 Mar 2020 22:02)  HR: 73 (22 Mar 2020 22:02) (71 - 73)  BP: 118/70 (22 Mar 2020 22:02) (106/65 - 118/70)  BP(mean): --  RR: 19 (22 Mar 2020 22:02) (17 - 19)  SpO2: 94% (22 Mar 2020 22:02) (91% - 94%)    REVIEW OF SYSTEMS:    All other review of systems is negative unless indicated above.    PHYSICAL EXAM:  General: WDWN  HEENT: NC/AT; PERRL, clear conjunctiva  Neck: supple, no JVD,   Cardiovascular: +S1/S2; RRR, no M/R/G  Respiratory: CTA b/l; no W/R/R  Gastrointestinal: soft, NT/ND; +BSx4  Extremities: WWP; 2+ peripheral pulses; no edema, contracted and deformed hands and feet secondary to RA   Neurological: AAOx3; no focal deficits    MEDICATIONS:  MEDICATIONS  (STANDING):  aspirin enteric coated 81 milliGRAM(s) Oral daily  azithromycin   Tablet 250 milliGRAM(s) Oral every 24 hours  citalopram 40 milliGRAM(s) Oral daily  enoxaparin Injectable 40 milliGRAM(s) SubCutaneous every 24 hours  hydroxychloroquine 200 milliGRAM(s) Oral every 12 hours  levothyroxine 25 MICROGram(s) Oral daily  pantoprazole    Tablet 20 milliGRAM(s) Oral before breakfast  predniSONE   Tablet 5 milliGRAM(s) Oral daily  pregabalin 100 milliGRAM(s) Oral three times a day  propranolol LA 80 milliGRAM(s) Oral daily  sulfaSALAzine 500 milliGRAM(s) Oral every 8 hours  thiamine IVPB 200 milliGRAM(s) IV Intermittent <User Schedule>    MEDICATIONS  (PRN):  acetaminophen   Tablet .. 650 milliGRAM(s) Oral every 6 hours PRN Temp greater or equal to 38C (100.4F), Mild Pain (1 - 3)      ALLERGIES:  Allergies    clindamycin (Unknown)  piperacillin (Unknown)  vancomycin (Unknown)    Intolerances        LABS:                        10.9   4.13  )-----------( 264      ( 23 Mar 2020 07:40 )             36.2     03-23    138  |  101  |  13  ----------------------------<  83  3.8   |  21<L>  |  0.51    Ca    8.9      23 Mar 2020 07:40  Phos  3.5     03-23  Mg     1.6     03-23    TPro  7.3  /  Alb  3.1<L>  /  TBili  0.4  /  DBili  x   /  AST  54<H>  /  ALT  35  /  AlkPhos  58  03-23        CAPILLARY BLOOD GLUCOSE          RADIOLOGY & ADDITIONAL TESTS: Reviewed.

## 2020-03-23 NOTE — PROGRESS NOTE ADULT - ASSESSMENT
63yo F with rheumatoid arthritis, fibromyalgia, presents from [facility] complaining of a 4-5 day of subjective fevers and a dry cough that has resolved. Now COVID-19 positive awaiting placement back at rehab.

## 2020-03-23 NOTE — PROGRESS NOTE ADULT - PROBLEM SELECTOR PLAN 1
Improved clinically - now with decreasing O2 requirements and normal RR  - Ferritin normal  - COVID +  - Legionella negative

## 2020-03-24 LAB
SARS-COV-2 RNA SPEC QL NAA+PROBE: DETECTED
SARS-COV-2 RNA SPEC QL NAA+PROBE: DETECTED

## 2020-03-24 PROCEDURE — 99233 SBSQ HOSP IP/OBS HIGH 50: CPT | Mod: GC

## 2020-03-24 PROCEDURE — 93010 ELECTROCARDIOGRAM REPORT: CPT

## 2020-03-24 RX ADMIN — Medication 5 MILLIGRAM(S): at 05:41

## 2020-03-24 RX ADMIN — Medication 81 MILLIGRAM(S): at 12:17

## 2020-03-24 RX ADMIN — Medication 80 MILLIGRAM(S): at 05:41

## 2020-03-24 RX ADMIN — CITALOPRAM 40 MILLIGRAM(S): 10 TABLET, FILM COATED ORAL at 12:16

## 2020-03-24 RX ADMIN — PANTOPRAZOLE SODIUM 20 MILLIGRAM(S): 20 TABLET, DELAYED RELEASE ORAL at 05:40

## 2020-03-24 RX ADMIN — Medication 500 MILLIGRAM(S): at 13:04

## 2020-03-24 RX ADMIN — Medication 100 MILLIGRAM(S): at 05:40

## 2020-03-24 RX ADMIN — Medication 25 MICROGRAM(S): at 05:40

## 2020-03-24 RX ADMIN — Medication 500 MILLIGRAM(S): at 21:34

## 2020-03-24 RX ADMIN — ENOXAPARIN SODIUM 40 MILLIGRAM(S): 100 INJECTION SUBCUTANEOUS at 12:17

## 2020-03-24 RX ADMIN — Medication 200 MILLIGRAM(S): at 05:41

## 2020-03-24 RX ADMIN — Medication 100 MILLIGRAM(S): at 21:27

## 2020-03-24 RX ADMIN — Medication 500 MILLIGRAM(S): at 05:41

## 2020-03-24 RX ADMIN — AZITHROMYCIN 250 MILLIGRAM(S): 500 TABLET, FILM COATED ORAL at 12:17

## 2020-03-24 RX ADMIN — Medication 200 MILLIGRAM(S): at 17:27

## 2020-03-24 RX ADMIN — Medication 100 MILLIGRAM(S): at 13:03

## 2020-03-24 NOTE — PROGRESS NOTE ADULT - PROBLEM SELECTOR PLAN 1
Improved clinically - now with decreasing O2 requirements and normal RR  Stable on 3L NC, while being titrated off. Supplemental O2 currently needed due to COVID-19 infection.   - COVID +  - Legionella negative  - c/w Plaquenil 200 mg BID, Azithromycin 500 mg daily, thiamine 200 mg BID Improved clinically - now with decreasing O2 requirements and normal RR. She has been asymptomatic for 72 hours. Stable on 3L NC, while being titrated off. Supplemental O2 currently needed due to COVID-19 infection.   - COVID +  - Legionella negative  - c/w Plaquenil 200 mg BID, Azithromycin 500 mg daily, thiamine 200 mg BID

## 2020-03-24 NOTE — PROGRESS NOTE ADULT - ASSESSMENT
65yo F with rheumatoid arthritis, fibromyalgia, who presented from [facility] facility with 4-5 day of subjective fevers and a dry cough that has resolved. Now COVID-19 positive awaiting placement back at rehab.

## 2020-03-24 NOTE — PROGRESS NOTE ADULT - SUBJECTIVE AND OBJECTIVE BOX
O/N Events: CHATO  Subjective/ROS: Met pt at bedside, resting comfortably in bed. Pt states that she feels goo and would like to go back to [facility] rehab. Denies HA, CP, SOB, n/v, changes in bowel/urinary habits.  12pt ROS otherwise negative.    VITALS  Vital Signs Last 24 Hrs  T(C): 36.4 (24 Mar 2020 12:40), Max: 37.3 (23 Mar 2020 22:07)  T(F): 97.5 (24 Mar 2020 12:40), Max: 99.2 (23 Mar 2020 22:07)  HR: 74 (24 Mar 2020 12:40) (74 - 78)  BP: 105/68 (24 Mar 2020 12:40) (105/68 - 107/62)  BP(mean): --  RR: 16 (24 Mar 2020 12:40) (15 - 18)  SpO2: 93% (24 Mar 2020 12:40) (93% - 94%)    CAPILLARY BLOOD GLUCOSE          PHYSICAL EXAM  General: pleasant, elderly lady. Resting comfortably in bed NC in place on 3L NC. A&Ox3; NAD  Head: NC/AT; MMM; PERRL; EOMI;  Neck: Supple; no JVD  Respiratory: decreased breath sounds in left middle lobe, speaking in full sentences, no increased work of breathing  Cardiovascular: Regular rhythm/rate; S1/S2   Gastrointestinal: Soft; NTND; normoactive BS  Extremities: WWP; no edema/cyanosis  Neurological:  CNII-XII grossly intact; no obvious focal deficits    MEDICATIONS  (STANDING):  aspirin enteric coated 81 milliGRAM(s) Oral daily  azithromycin   Tablet 250 milliGRAM(s) Oral every 24 hours  citalopram 40 milliGRAM(s) Oral daily  enoxaparin Injectable 40 milliGRAM(s) SubCutaneous every 24 hours  hydroxychloroquine 200 milliGRAM(s) Oral every 12 hours  levothyroxine 25 MICROGram(s) Oral daily  pantoprazole    Tablet 20 milliGRAM(s) Oral before breakfast  predniSONE   Tablet 5 milliGRAM(s) Oral daily  pregabalin 100 milliGRAM(s) Oral three times a day  propranolol LA 80 milliGRAM(s) Oral daily  sulfaSALAzine 500 milliGRAM(s) Oral every 8 hours  thiamine IVPB 200 milliGRAM(s) IV Intermittent <User Schedule>    MEDICATIONS  (PRN):      clindamycin (Unknown)  piperacillin (Unknown)  vancomycin (Unknown)      LABS                        10.9   4.13  )-----------( 264      ( 23 Mar 2020 07:40 )             36.2     03-23    138  |  101  |  13  ----------------------------<  83  3.8   |  21<L>  |  0.51    Ca    8.9      23 Mar 2020 07:40  Phos  3.5     03-23  Mg     1.6     03-23    TPro  7.3  /  Alb  3.1<L>  /  TBili  0.4  /  DBili  x   /  AST  54<H>  /  ALT  35  /  AlkPhos  58  03-23        CARDIAC MARKERS ( 23 Mar 2020 07:40 )  x     / <0.01 ng/mL / 129 U/L / x     / x            IMAGING/EKG/ETC: Reviewed O/N Events: CHATO  Subjective/ROS: Met pt at bedside, resting comfortably in bed. Pt states that she feels good and would like to go back to [facility] rehab. Denies HA, CP, SOB, n/v, changes in bowel/urinary habits.  12pt ROS otherwise negative.    VITALS  Vital Signs Last 24 Hrs  T(C): 36.4 (24 Mar 2020 12:40), Max: 37.3 (23 Mar 2020 22:07)  T(F): 97.5 (24 Mar 2020 12:40), Max: 99.2 (23 Mar 2020 22:07)  HR: 74 (24 Mar 2020 12:40) (74 - 78)  BP: 105/68 (24 Mar 2020 12:40) (105/68 - 107/62)  BP(mean): --  RR: 16 (24 Mar 2020 12:40) (15 - 18)  SpO2: 93% (24 Mar 2020 12:40) (93% - 94%)    CAPILLARY BLOOD GLUCOSE          PHYSICAL EXAM  General: pleasant, elderly lady. Resting comfortably in bed NC in place on 3L NC. A&Ox3; NAD  Head: NC/AT; MMM; PERRL; EOMI;  Neck: Supple; no JVD  Respiratory: decreased breath sounds in left middle lobe, speaking in full sentences, no increased work of breathing  Cardiovascular: Regular rhythm/rate; S1/S2   Gastrointestinal: Soft; NTND; normoactive BS  Extremities: WWP; no edema/cyanosis  Neurological:  CNII-XII grossly intact; no obvious focal deficits    MEDICATIONS  (STANDING):  aspirin enteric coated 81 milliGRAM(s) Oral daily  azithromycin   Tablet 250 milliGRAM(s) Oral every 24 hours  citalopram 40 milliGRAM(s) Oral daily  enoxaparin Injectable 40 milliGRAM(s) SubCutaneous every 24 hours  hydroxychloroquine 200 milliGRAM(s) Oral every 12 hours  levothyroxine 25 MICROGram(s) Oral daily  pantoprazole    Tablet 20 milliGRAM(s) Oral before breakfast  predniSONE   Tablet 5 milliGRAM(s) Oral daily  pregabalin 100 milliGRAM(s) Oral three times a day  propranolol LA 80 milliGRAM(s) Oral daily  sulfaSALAzine 500 milliGRAM(s) Oral every 8 hours  thiamine IVPB 200 milliGRAM(s) IV Intermittent <User Schedule>    MEDICATIONS  (PRN):      clindamycin (Unknown)  piperacillin (Unknown)  vancomycin (Unknown)      LABS                        10.9   4.13  )-----------( 264      ( 23 Mar 2020 07:40 )             36.2     03-23    138  |  101  |  13  ----------------------------<  83  3.8   |  21<L>  |  0.51    Ca    8.9      23 Mar 2020 07:40  Phos  3.5     03-23  Mg     1.6     03-23    TPro  7.3  /  Alb  3.1<L>  /  TBili  0.4  /  DBili  x   /  AST  54<H>  /  ALT  35  /  AlkPhos  58  03-23        CARDIAC MARKERS ( 23 Mar 2020 07:40 )  x     / <0.01 ng/mL / 129 U/L / x     / x            IMAGING/EKG/ETC: Reviewed HOSPITAL COURSE  63 y/o F with rheumatoid arthritis, fibromyalgia admitted from [facility] rehab with fevers and resolved dry cough in setting of known numerous COVID+ residents at facility. Labs significant for lymphopenia and elevated inflammatory markers (CRP, LDH, ferritin).  RVP and urine legionella negative. CXR demonstrating right basilar opacity and bibasilar focal atelectasis, with obscured right cardiac border. Patient found to be COVID+, but also treated fro CAP with ceftriaxone and azithromycin. Patient is medically optimized and  hemodynamically stable and safe for step down to 4La. Patient still requiring O2 (desaturation to 88% on room air).     O/N Events: CHATO  Subjective/ROS: Met pt at bedside, resting comfortably in bed. Pt states that she feels good and would like to go back to [facility] rehab. Denies HA, CP, SOB, n/v, changes in bowel/urinary habits.  12pt ROS otherwise negative.    VITALS  Vital Signs Last 24 Hrs  T(C): 36.4 (24 Mar 2020 12:40), Max: 37.3 (23 Mar 2020 22:07)  T(F): 97.5 (24 Mar 2020 12:40), Max: 99.2 (23 Mar 2020 22:07)  HR: 74 (24 Mar 2020 12:40) (74 - 78)  BP: 105/68 (24 Mar 2020 12:40) (105/68 - 107/62)  BP(mean): --  RR: 16 (24 Mar 2020 12:40) (15 - 18)  SpO2: 93% (24 Mar 2020 12:40) (93% - 94%)    CAPILLARY BLOOD GLUCOSE          PHYSICAL EXAM  General: pleasant, elderly lady. Resting comfortably in bed NC in place on 3L NC. A&Ox3; NAD  Head: NC/AT; MMM; PERRL; EOMI;  Neck: Supple; no JVD  Respiratory: decreased breath sounds in left middle lobe, speaking in full sentences, no increased work of breathing  Cardiovascular: Regular rhythm/rate; S1/S2   Gastrointestinal: Soft; NTND; normoactive BS  Extremities: WWP; no edema/cyanosis  Neurological:  CNII-XII grossly intact; no obvious focal deficits    MEDICATIONS  (STANDING):  aspirin enteric coated 81 milliGRAM(s) Oral daily  azithromycin   Tablet 250 milliGRAM(s) Oral every 24 hours  citalopram 40 milliGRAM(s) Oral daily  enoxaparin Injectable 40 milliGRAM(s) SubCutaneous every 24 hours  hydroxychloroquine 200 milliGRAM(s) Oral every 12 hours  levothyroxine 25 MICROGram(s) Oral daily  pantoprazole    Tablet 20 milliGRAM(s) Oral before breakfast  predniSONE   Tablet 5 milliGRAM(s) Oral daily  pregabalin 100 milliGRAM(s) Oral three times a day  propranolol LA 80 milliGRAM(s) Oral daily  sulfaSALAzine 500 milliGRAM(s) Oral every 8 hours  thiamine IVPB 200 milliGRAM(s) IV Intermittent <User Schedule>    MEDICATIONS  (PRN):      clindamycin (Unknown)  piperacillin (Unknown)  vancomycin (Unknown)      LABS                        10.9   4.13  )-----------( 264      ( 23 Mar 2020 07:40 )             36.2     03-23    138  |  101  |  13  ----------------------------<  83  3.8   |  21<L>  |  0.51    Ca    8.9      23 Mar 2020 07:40  Phos  3.5     03-23  Mg     1.6     03-23    TPro  7.3  /  Alb  3.1<L>  /  TBili  0.4  /  DBili  x   /  AST  54<H>  /  ALT  35  /  AlkPhos  58  03-23        CARDIAC MARKERS ( 23 Mar 2020 07:40 )  x     / <0.01 ng/mL / 129 U/L / x     / x            IMAGING/EKG/ETC: Reviewed

## 2020-03-24 NOTE — PROGRESS NOTE ADULT - PROBLEM SELECTOR PLAN 9
1) PCP Contacted on Admission: (Y/N) --> Name & Phone #: Dr. Padilla, but Dr. Cordero covering.  2) Date of Contact with PCP: 3/15/2020  3) PCP Contacted at Discharge: (Y/N, N/A)  4) Summary of Handoff Given to PCP:   5) Post-Discharge Appointment Date and Location:

## 2020-03-25 PROCEDURE — 99233 SBSQ HOSP IP/OBS HIGH 50: CPT | Mod: GC

## 2020-03-25 RX ORDER — THIAMINE MONONITRATE (VIT B1) 100 MG
200 TABLET ORAL
Refills: 0 | Status: DISCONTINUED | OUTPATIENT
Start: 2020-03-25 | End: 2020-03-26

## 2020-03-25 RX ADMIN — Medication 100 MILLIGRAM(S): at 23:27

## 2020-03-25 RX ADMIN — Medication 5 MILLIGRAM(S): at 06:03

## 2020-03-25 RX ADMIN — PANTOPRAZOLE SODIUM 20 MILLIGRAM(S): 20 TABLET, DELAYED RELEASE ORAL at 06:05

## 2020-03-25 RX ADMIN — CITALOPRAM 40 MILLIGRAM(S): 10 TABLET, FILM COATED ORAL at 11:50

## 2020-03-25 RX ADMIN — AZITHROMYCIN 250 MILLIGRAM(S): 500 TABLET, FILM COATED ORAL at 11:51

## 2020-03-25 RX ADMIN — Medication 80 MILLIGRAM(S): at 06:05

## 2020-03-25 RX ADMIN — Medication 100 MILLIGRAM(S): at 06:05

## 2020-03-25 RX ADMIN — Medication 200 MILLIGRAM(S): at 06:03

## 2020-03-25 RX ADMIN — Medication 500 MILLIGRAM(S): at 06:04

## 2020-03-25 RX ADMIN — ENOXAPARIN SODIUM 40 MILLIGRAM(S): 100 INJECTION SUBCUTANEOUS at 11:51

## 2020-03-25 RX ADMIN — Medication 200 MILLIGRAM(S): at 17:49

## 2020-03-25 RX ADMIN — Medication 81 MILLIGRAM(S): at 11:51

## 2020-03-25 RX ADMIN — Medication 25 MICROGRAM(S): at 06:03

## 2020-03-25 RX ADMIN — Medication 500 MILLIGRAM(S): at 17:49

## 2020-03-25 RX ADMIN — Medication 100 MILLIGRAM(S): at 12:55

## 2020-03-25 NOTE — CHART NOTE - NSCHARTNOTEFT_GEN_A_CORE
Admitting Diagnosis:   Patient is a 64y old  Female who presents with a chief complaint of SOB (24 Mar 2020 14:07)      PAST MEDICAL & SURGICAL HISTORY:  Fibromyalgia  Depression  Hypothyroid  Osteoporosis  RA (rheumatoid arthritis)      Current Nutrition Order:DASH       PO Intake: Good (%) [   ]  Fair (50-75%) [ x  ] Poor (<25%) [   ]As per patient,has little desire to eat solid foods and wants only fluids.    GI Issues: denied    Pain:denied    Skin Integrity:intact    Labs:         CAPILLARY BLOOD GLUCOSE          Medications:  MEDICATIONS  (STANDING):  aspirin enteric coated 81 milliGRAM(s) Oral daily  azithromycin   Tablet 250 milliGRAM(s) Oral every 24 hours  citalopram 40 milliGRAM(s) Oral daily  enoxaparin Injectable 40 milliGRAM(s) SubCutaneous every 24 hours  hydroxychloroquine 200 milliGRAM(s) Oral every 12 hours  levothyroxine 25 MICROGram(s) Oral daily  pantoprazole    Tablet 20 milliGRAM(s) Oral before breakfast  predniSONE   Tablet 5 milliGRAM(s) Oral daily  pregabalin 100 milliGRAM(s) Oral three times a day  propranolol LA 80 milliGRAM(s) Oral daily  sulfaSALAzine 500 milliGRAM(s) Oral every 8 hours  thiamine IVPB 200 milliGRAM(s) IV Intermittent <User Schedule>    MEDICATIONS  (PRN):      Weight:109.3kg  Daily     Daily no updated weights    Weight Change: no updated weights    Nutrition Focused Physical Exam: Completed [   ]  Not Pertinent [  x ]  Muscle Wasting- Temporal [   ]  Clavicle/Pectoral [   ]  Shoulder/Deltoid [   ]  Scapula [   ]  Interosseous [   ]  Quadriceps [   ]  Gastrocnemius [   ]  Fat Wasting- Orbital [   ]  Buccal [   ]  Triceps [   ]  Rib [   ]  Suspect [PCM] 2/2 to physical assessment, [poor intake], and [wt loss]; please see malnutrition chart note.    Estimated energy needs: based on IBW:63.5kg(due to above 120% of IBW)z64-05kzcy:1587-1905kcal and 1gmprotein:63.5gm and 30-35ccfluids:1905-2222cc fluids    Subjective: 65y/o female with history of RA,Fibromyalagia presented with fevers/cough and found to be COVID-19+,Per discussion with patient over the phone,patient is not eating much and wants only fluids.RD informed patient that she would provide with additional fluids. Possible transfer to regional floor    Previous Nutrition Diagnosis: Increased nutrient needs r/t increased demand for kcal/protein and fluids AEB: fever and COVID-19+.    Active [  x ]  Resolved [   ]    If resolved, new PES:     Goal:Meet 80% of needs consistently    Recommendations:1.If able to update weights 2.Provide patient with increased fluids    Education: completed increased fluids    Risk Level: High [   ] Moderate [ x ] Low [   ]

## 2020-03-25 NOTE — PROGRESS NOTE ADULT - SUBJECTIVE AND OBJECTIVE BOX
Interval / Overnight: CHATO    Subjective: Ms. Hewitt tells me that she "feels like [she's] going crazy". She really wants to go back to [facility] rehab. She does endorse anorexia, but denies fevers, chills, sore throat, chest pain, cough, n/v.     VITAL SIGNS:  Vital Signs Last 24 Hrs  T(C): 37.1 (24 Mar 2020 21:34), Max: 37.1 (24 Mar 2020 21:34)  T(F): 98.8 (24 Mar 2020 21:34), Max: 98.8 (24 Mar 2020 21:34)  HR: 98 (24 Mar 2020 21:34) (98 - 98)  BP: 109/69 (24 Mar 2020 21:34) (109/69 - 109/69)  BP(mean): --  RR: 17 (24 Mar 2020 21:34) (17 - 17)  SpO2: 94% (24 Mar 2020 21:34) (94% - 94%)    PHYSICAL EXAM:    General: patient repositioned -- bed converted into a chair-like position, non-ill appearing  HEENT: normocephalic, atraumatic; PERRL, anicteric sclera; MMM   Neck: supple, no thyromegaly, no lymphadenopathy  Cardiovascular: distant heart sounds, RRR, one heart sound auscultated   Respiratory: Breathing comfortably, no accessory muscle use, clear to auscultation without wheezes, rales, rhonchi  Gastrointestinal: soft, NT/ND; +BSx4  Extremities: WWP; severe deformities of of hands and feet, peripheral edema   Vascular: 2+ radial BL, 2+ DP BL  Neurological: Alert and fully conversational     MEDICATIONS:  MEDICATIONS  (STANDING):  aspirin enteric coated 81 milliGRAM(s) Oral daily  azithromycin   Tablet 250 milliGRAM(s) Oral every 24 hours  citalopram 40 milliGRAM(s) Oral daily  enoxaparin Injectable 40 milliGRAM(s) SubCutaneous every 24 hours  hydroxychloroquine 200 milliGRAM(s) Oral every 12 hours  levothyroxine 25 MICROGram(s) Oral daily  pantoprazole    Tablet 20 milliGRAM(s) Oral before breakfast  predniSONE   Tablet 5 milliGRAM(s) Oral daily  pregabalin 100 milliGRAM(s) Oral three times a day  propranolol LA 80 milliGRAM(s) Oral daily  sulfaSALAzine 500 milliGRAM(s) Oral every 8 hours  thiamine IVPB 200 milliGRAM(s) IV Intermittent <User Schedule>    MEDICATIONS  (PRN):      ALLERGIES:  Allergies    clindamycin (Unknown)  piperacillin (Unknown)  vancomycin (Unknown)    Intolerances        LABS:              CAPILLARY BLOOD GLUCOSE          RADIOLOGY & ADDITIONAL TESTS: Reviewed. HOSPITAL COURSE  65 y/o F with rheumatoid arthritis, fibromyalgia admitted from [facility] rehab with fevers and resolved dry cough in setting of known numerous COVID+ residents at facility. Labs significant for lymphopenia and elevated inflammatory markers (CRP, LDH, ferritin).  RVP and urine legionella negative. CXR demonstrating right basilar opacity and bibasilar focal atelectasis, with obscured right cardiac border. Patient found to be COVID+, but also treated fro CAP with ceftriaxone and azithromycin. Patient is medically optimized and  hemodynamically stable and safe for step down to [facility]. Patient still requiring O2 (desaturation to 88% on room air).     Interval / Overnight: CHATO    Subjective: Ms. Hewitt tells me that she "feels like [she's] going crazy". She really wants to go back to [facility] rehab. She does endorse anorexia, but denies fevers, chills, sore throat, chest pain, cough, n/v.     VITAL SIGNS:  Vital Signs Last 24 Hrs  T(C): 37.1 (24 Mar 2020 21:34), Max: 37.1 (24 Mar 2020 21:34)  T(F): 98.8 (24 Mar 2020 21:34), Max: 98.8 (24 Mar 2020 21:34)  HR: 98 (24 Mar 2020 21:34) (98 - 98)  BP: 109/69 (24 Mar 2020 21:34) (109/69 - 109/69)  BP(mean): --  RR: 17 (24 Mar 2020 21:34) (17 - 17)  SpO2: 94% (24 Mar 2020 21:34) (94% - 94%)    PHYSICAL EXAM:    General: patient repositioned -- bed converted into a chair-like position, non-ill appearing  HEENT: normocephalic, atraumatic; PERRL, anicteric sclera; MMM   Neck: supple, no thyromegaly, no lymphadenopathy  Cardiovascular: distant heart sounds, RRR, one heart sound auscultated   Respiratory: Breathing comfortably, no accessory muscle use, clear to auscultation without wheezes, rales, rhonchi  Gastrointestinal: soft, NT/ND; +BSx4  Extremities: WWP; severe deformities of of hands and feet, peripheral edema   Vascular: 2+ radial BL, 2+ DP BL  Neurological: Alert and fully conversational     MEDICATIONS:  MEDICATIONS  (STANDING):  aspirin enteric coated 81 milliGRAM(s) Oral daily  azithromycin   Tablet 250 milliGRAM(s) Oral every 24 hours  citalopram 40 milliGRAM(s) Oral daily  enoxaparin Injectable 40 milliGRAM(s) SubCutaneous every 24 hours  hydroxychloroquine 200 milliGRAM(s) Oral every 12 hours  levothyroxine 25 MICROGram(s) Oral daily  pantoprazole    Tablet 20 milliGRAM(s) Oral before breakfast  predniSONE   Tablet 5 milliGRAM(s) Oral daily  pregabalin 100 milliGRAM(s) Oral three times a day  propranolol LA 80 milliGRAM(s) Oral daily  sulfaSALAzine 500 milliGRAM(s) Oral every 8 hours  thiamine IVPB 200 milliGRAM(s) IV Intermittent <User Schedule>    MEDICATIONS  (PRN):      ALLERGIES:  Allergies    clindamycin (Unknown)  piperacillin (Unknown)  vancomycin (Unknown)    Intolerances        LABS:              CAPILLARY BLOOD GLUCOSE          RADIOLOGY & ADDITIONAL TESTS: Reviewed. HOSPITAL COURSE  63 y/o F with rheumatoid arthritis, fibromyalgia admitted from [facility] rehab with fevers and resolved dry cough in setting of known numerous COVID+ residents at facility. Labs significant for lymphopenia and elevated inflammatory markers (CRP, LDH, ferritin).  RVP and urine legionella negative. CXR demonstrating right basilar opacity and bibasilar focal atelectasis, with obscured right cardiac border. Patient found to be COVID+, but also treated fro CAP with ceftriaxone and azithromycin. Patient is medically optimized and  hemodynamically stable and safe for step down to 4[facility]. Patient now saturating @ 93% on RA. Desaturation was likely 2/2 to atelectasis.     Interval / Overnight: CHATO    Subjective: Ms. Hewitt tells me that she "feels like [she's] going crazy". She really wants to go back to [facility] rehab. She does endorse anorexia, but denies fevers, chills, sore throat, chest pain, cough, n/v.     VITAL SIGNS:  Vital Signs Last 24 Hrs  T(C): 37.1 (24 Mar 2020 21:34), Max: 37.1 (24 Mar 2020 21:34)  T(F): 98.8 (24 Mar 2020 21:34), Max: 98.8 (24 Mar 2020 21:34)  HR: 98 (24 Mar 2020 21:34) (98 - 98)  BP: 109/69 (24 Mar 2020 21:34) (109/69 - 109/69)  BP(mean): --  RR: 17 (24 Mar 2020 21:34) (17 - 17)  SpO2: 94% (24 Mar 2020 21:34) (94% - 94%)    PHYSICAL EXAM:    General: patient repositioned -- bed converted into a chair-like position, non-ill appearing  HEENT: normocephalic, atraumatic; PERRL, anicteric sclera; MMM   Neck: supple, no thyromegaly, no lymphadenopathy  Cardiovascular: distant heart sounds, RRR, one heart sound auscultated   Respiratory: Breathing comfortably, no accessory muscle use, clear to auscultation without wheezes, rales, rhonchi  Gastrointestinal: soft, NT/ND; +BSx4  Extremities: WWP; severe deformities of of hands and feet, peripheral edema   Vascular: 2+ radial BL, 2+ DP BL  Neurological: Alert and fully conversational     MEDICATIONS:  MEDICATIONS  (STANDING):  aspirin enteric coated 81 milliGRAM(s) Oral daily  azithromycin   Tablet 250 milliGRAM(s) Oral every 24 hours  citalopram 40 milliGRAM(s) Oral daily  enoxaparin Injectable 40 milliGRAM(s) SubCutaneous every 24 hours  hydroxychloroquine 200 milliGRAM(s) Oral every 12 hours  levothyroxine 25 MICROGram(s) Oral daily  pantoprazole    Tablet 20 milliGRAM(s) Oral before breakfast  predniSONE   Tablet 5 milliGRAM(s) Oral daily  pregabalin 100 milliGRAM(s) Oral three times a day  propranolol LA 80 milliGRAM(s) Oral daily  sulfaSALAzine 500 milliGRAM(s) Oral every 8 hours  thiamine IVPB 200 milliGRAM(s) IV Intermittent <User Schedule>    MEDICATIONS  (PRN):      ALLERGIES:  Allergies    clindamycin (Unknown)  piperacillin (Unknown)  vancomycin (Unknown)    Intolerances        LABS:              CAPILLARY BLOOD GLUCOSE          RADIOLOGY & ADDITIONAL TESTS: Reviewed.

## 2020-03-25 NOTE — PROGRESS NOTE ADULT - ASSESSMENT
65yo F with rheumatoid arthritis, fibromyalgia, who presented from [facility] facility with 4-5 day of subjective fevers and a dry cough that has resolved. Now COVID-19 positive awaiting placement back at rehab.

## 2020-03-25 NOTE — PROGRESS NOTE ADULT - PROBLEM SELECTOR PLAN 8
"Anesthesia Transfer of Care Note    Patient: Manohar Harman    Procedure(s) Performed: Procedure(s) (LRB):  COLONOSCOPY (N/A)    Patient location: PACU    Anesthesia Type: general    Transport from OR: Transported from OR on room air with adequate spontaneous ventilation    Post pain: adequate analgesia    Post assessment: no apparent anesthetic complications and tolerated procedure well    Post vital signs: stable    Level of consciousness: awake, alert and oriented    Nausea/Vomiting: no nausea/vomiting    Complications: none    Transfer of care protocol was followed      Last vitals:   Visit Vitals  BP (!) 114/51   Pulse 83   Temp 36.5 °C (97.7 °F) (Oral)   Resp 19   Ht 6' 1" (1.854 m)   Wt 93.4 kg (206 lb)   SpO2 95%   BMI 27.18 kg/m²     "
Ppx: protonix and lovenox  DNR/DNI

## 2020-03-25 NOTE — PROGRESS NOTE ADULT - PROBLEM SELECTOR PLAN 1
Improved clinically - now with decreasing O2 requirements and normal RR. She has been asymptomatic for 72 hours. Stable on 3L NC, while being titrated off. Supplemental O2 currently needed due to COVID-19 infection.   - COVID +  - Legionella negative  - c/w Plaquenil 200 mg BID, Azithromycin 500 mg daily, thiamine 200 mg BID    #desaturation  Patient came from [facility] rehab off of supplemental O2 and in house has been requiring nasal cannula (off of nasal cannula O2 sat ~90%). This is likely position dependent with atelectasis  - Repositioning  - incentive spirometer

## 2020-03-25 NOTE — CONSULT NOTE ADULT - SUBJECTIVE AND OBJECTIVE BOX
Patient is a 64y old  Female who presents with a chief complaint of SOB (24 Mar 2020 14:07)       HPI:  64F with rheumatoid arthritis, fibromyalgia, presents from [email or web address] complaining of a 4-5 day of subjective fevers and a dry cough (resolved approximately 2 days ago). Patient expressed concern for COVID-19 given that numerous residents at the facility were in contact with a COVID-19 positive patient. Patient had not been directly in contact with any COVID-19 positive patient, and her roommate is not positive as well, nor symptomatic. Patient during time of evaluation in the ED felt "fine, I am only concerned whether or not I have the virus, and why I am sweating so much. I feel very hot." Patent denies chills, nausea, vomiting, changes in bowel movement (had 1 bowel movement during encounter). 12 point review of system negative for everything except some intermittent joint pain related to her RA. Patient otherwise feels close to her baseline, when asked scale 1-10, 10 being 100% normal, patient stated 8-9.    ED vitals: T 101.3, HR 73, /72, RR 18, 96%  ED labs: Na 113, , ALT 97, U/A dirty (negative), RVP negative  ED studies: CXR w/ elevated R hemidiaphragm, b/l increased interstitial markings  ED course: 1L NS bolus, Ceftriaxone 1g at 11 AM (15 Mar 2020 13:34)      PAST MEDICAL & SURGICAL HISTORY:  Fibromyalgia  Depression  Hypothyroid  Osteoporosis  RA (rheumatoid arthritis)      MEDICATIONS  (STANDING):  aspirin enteric coated 81 milliGRAM(s) Oral daily  azithromycin   Tablet 250 milliGRAM(s) Oral every 24 hours  citalopram 40 milliGRAM(s) Oral daily  enoxaparin Injectable 40 milliGRAM(s) SubCutaneous every 24 hours  hydroxychloroquine 200 milliGRAM(s) Oral every 12 hours  levothyroxine 25 MICROGram(s) Oral daily  pantoprazole    Tablet 20 milliGRAM(s) Oral before breakfast  predniSONE   Tablet 5 milliGRAM(s) Oral daily  pregabalin 100 milliGRAM(s) Oral three times a day  propranolol LA 80 milliGRAM(s) Oral daily  sulfaSALAzine 500 milliGRAM(s) Oral every 8 hours  thiamine IVPB 200 milliGRAM(s) IV Intermittent <User Schedule>    MEDICATIONS  (PRN):        FAMILY HISTORY:      Radiology:    < from: Xray Chest 1 View- PORTABLE-Urgent (03.21.20 @ 12:19) >  EXAM:  XR CHEST PORTABLE URGENT 1V                          PROCEDURE DATE:  03/21/2020          INTERPRETATION:  Clinical History: Fever    Portable examination the chest demonstrates progression congestion and/or infiltrates in comparison to prior examination of the chest 3/15/2020. Limited inspiration. Cardiomegaly.    Impression: Congestion and/or infiltrates                  Vital Signs Last 24 Hrs  T(C): 37.1 (24 Mar 2020 21:34), Max: 37.1 (24 Mar 2020 21:34)  T(F): 98.8 (24 Mar 2020 21:34), Max: 98.8 (24 Mar 2020 21:34)  HR: 98 (24 Mar 2020 21:34) (74 - 98)  BP: 109/69 (24 Mar 2020 21:34) (105/68 - 109/69)  BP(mean): --  RR: 17 (24 Mar 2020 21:34) (15 - 17)  SpO2: 94% (24 Mar 2020 21:34) (93% - 94%)    REVIEW OF SYSTEMS:    CONSTITUTIONAL:  fatigue  EYES: No eye pain, visual disturbances, or discharge  ENMT:  No difficulty hearing, tinnitus, vertigo; No sinus or throat pain  NECK: No pain or stiffness  BREASTS: No pain, masses, or nipple discharge  RESPIRATORY: No cough, wheezing, chills or hemoptysis; No shortness of breath  CARDIOVASCULAR: No chest pain, palpitations, dizziness, or leg swelling  GASTROINTESTINAL: No abdominal or epigastric pain. No nausea, vomiting, or hematemesis; No diarrhea or constipation. No melena or hematochezia.  GENITOURINARY: No dysuria, frequency, hematuria, or incontinence  NEUROLOGICAL: No headaches, memory loss, loss of strength, numbness, or tremors  SKIN: No itching, burning, rashes, or lesions   LYMPH NODES: No enlarged glands  ENDOCRINE: No heat or cold intolerance; No hair loss  MUSCULOSKELETAL: No joint pain or swelling; No muscle, back, or extremity pain  PSYCHIATRIC: No depression, anxiety, mood swings, or difficulty sleeping  HEME/LYMPH: No easy bruising, or bleeding gums  ALLERGY AND IMMUNOLOGIC: No hives or eczema  VASCULAR: no swelling, erythema        Physical Exam: on COVID isolation, 65 yo obese woman lying comfortably in bed, c/o fatigue    Head: normocephalic, atraumatic    Eyes: PERRLA, EOMI, no nystagmus, sclera anicteric    ENT: nasal discharge, uvula midline, no oropharyngeal erythema/exudate    Neck: supple, negative JVD, negative carotid bruits, no thyromegaly    Chest: CTA bilaterally, neg wheeze/ rhonchi/ rales/ crackles/ egophany    Cardiovascular: regular rate and rhythm, neg murmurs/rubs/gallops    Abdomen: soft, obese, non tender to palpation in all 4 quadrants, negative rebound/guarding, normal bowel sounds    Extremities: LE edema, negative calf tenderness to palpation, negative Parker's sign, RA hand / foot deformities      :     Neurologic Exam:    Alert and oriented to person, place, date/year      Motor Exam:    Upper Extremities:     RIght:  no focal weakness    Left :   no focal weakness    Lower Extremities:                 Right:   no focal weakness                 Left:     no focal weakness                 Sensory:    intact to LT/PP in all UE/LE dermatomes                     DTR:             = biceps/     triceps/     brachioradialis                      = patella/   medial hamstring/ankle                      neg clonus                      neg Babinski                        Gait:  not tested        PM&R Impression:    1) deconditioned  2) no focal weakness    Plan:    1) Physical therapy focusing on therapeutic exercises, bed mobility/transfer out of bed evaluation, progressive ambulation with assistive devices prn.    2) Anticipated Disposition Plan/Recs: return to [facility]

## 2020-03-26 VITALS
OXYGEN SATURATION: 94 % | TEMPERATURE: 98 F | DIASTOLIC BLOOD PRESSURE: 61 MMHG | HEART RATE: 81 BPM | RESPIRATION RATE: 17 BRPM | SYSTOLIC BLOOD PRESSURE: 96 MMHG

## 2020-03-26 PROCEDURE — 80048 BASIC METABOLIC PNL TOTAL CA: CPT

## 2020-03-26 PROCEDURE — 82330 ASSAY OF CALCIUM: CPT

## 2020-03-26 PROCEDURE — 87581 M.PNEUMON DNA AMP PROBE: CPT

## 2020-03-26 PROCEDURE — 71045 X-RAY EXAM CHEST 1 VIEW: CPT

## 2020-03-26 PROCEDURE — 87040 BLOOD CULTURE FOR BACTERIA: CPT

## 2020-03-26 PROCEDURE — 83880 ASSAY OF NATRIURETIC PEPTIDE: CPT

## 2020-03-26 PROCEDURE — 99232 SBSQ HOSP IP/OBS MODERATE 35: CPT

## 2020-03-26 PROCEDURE — 87486 CHLMYD PNEUM DNA AMP PROBE: CPT

## 2020-03-26 PROCEDURE — 84132 ASSAY OF SERUM POTASSIUM: CPT

## 2020-03-26 PROCEDURE — 84100 ASSAY OF PHOSPHORUS: CPT

## 2020-03-26 PROCEDURE — 84295 ASSAY OF SERUM SODIUM: CPT

## 2020-03-26 PROCEDURE — 87798 DETECT AGENT NOS DNA AMP: CPT

## 2020-03-26 PROCEDURE — 82728 ASSAY OF FERRITIN: CPT

## 2020-03-26 PROCEDURE — 86140 C-REACTIVE PROTEIN: CPT

## 2020-03-26 PROCEDURE — 87633 RESP VIRUS 12-25 TARGETS: CPT

## 2020-03-26 PROCEDURE — 99285 EMERGENCY DEPT VISIT HI MDM: CPT | Mod: 25

## 2020-03-26 PROCEDURE — 82803 BLOOD GASES ANY COMBINATION: CPT

## 2020-03-26 PROCEDURE — 87086 URINE CULTURE/COLONY COUNT: CPT

## 2020-03-26 PROCEDURE — 85379 FIBRIN DEGRADATION QUANT: CPT

## 2020-03-26 PROCEDURE — 83615 LACTATE (LD) (LDH) ENZYME: CPT

## 2020-03-26 PROCEDURE — 82550 ASSAY OF CK (CPK): CPT

## 2020-03-26 PROCEDURE — 85027 COMPLETE CBC AUTOMATED: CPT

## 2020-03-26 PROCEDURE — 81001 URINALYSIS AUTO W/SCOPE: CPT

## 2020-03-26 PROCEDURE — 85730 THROMBOPLASTIN TIME PARTIAL: CPT

## 2020-03-26 PROCEDURE — 84145 PROCALCITONIN (PCT): CPT

## 2020-03-26 PROCEDURE — 93005 ELECTROCARDIOGRAM TRACING: CPT

## 2020-03-26 PROCEDURE — 83735 ASSAY OF MAGNESIUM: CPT

## 2020-03-26 PROCEDURE — 36415 COLL VENOUS BLD VENIPUNCTURE: CPT

## 2020-03-26 PROCEDURE — 80053 COMPREHEN METABOLIC PANEL: CPT

## 2020-03-26 PROCEDURE — 86803 HEPATITIS C AB TEST: CPT

## 2020-03-26 PROCEDURE — 96365 THER/PROPH/DIAG IV INF INIT: CPT

## 2020-03-26 PROCEDURE — 87635 SARS-COV-2 COVID-19 AMP PRB: CPT

## 2020-03-26 PROCEDURE — 85025 COMPLETE CBC W/AUTO DIFF WBC: CPT

## 2020-03-26 PROCEDURE — 87449 NOS EACH ORGANISM AG IA: CPT

## 2020-03-26 PROCEDURE — 83605 ASSAY OF LACTIC ACID: CPT

## 2020-03-26 PROCEDURE — 85652 RBC SED RATE AUTOMATED: CPT

## 2020-03-26 PROCEDURE — 85610 PROTHROMBIN TIME: CPT

## 2020-03-26 PROCEDURE — 84484 ASSAY OF TROPONIN QUANT: CPT

## 2020-03-26 RX ORDER — THIAMINE MONONITRATE (VIT B1) 100 MG
2 TABLET ORAL
Qty: 0 | Refills: 0 | DISCHARGE
Start: 2020-03-26

## 2020-03-26 RX ADMIN — Medication 100 MILLIGRAM(S): at 05:43

## 2020-03-26 RX ADMIN — Medication 25 MICROGRAM(S): at 05:44

## 2020-03-26 RX ADMIN — Medication 500 MILLIGRAM(S): at 14:00

## 2020-03-26 RX ADMIN — AZITHROMYCIN 250 MILLIGRAM(S): 500 TABLET, FILM COATED ORAL at 09:00

## 2020-03-26 RX ADMIN — Medication 500 MILLIGRAM(S): at 05:43

## 2020-03-26 RX ADMIN — ENOXAPARIN SODIUM 40 MILLIGRAM(S): 100 INJECTION SUBCUTANEOUS at 12:46

## 2020-03-26 RX ADMIN — Medication 200 MILLIGRAM(S): at 05:44

## 2020-03-26 RX ADMIN — PANTOPRAZOLE SODIUM 20 MILLIGRAM(S): 20 TABLET, DELAYED RELEASE ORAL at 06:23

## 2020-03-26 RX ADMIN — Medication 81 MILLIGRAM(S): at 12:46

## 2020-03-26 RX ADMIN — Medication 80 MILLIGRAM(S): at 05:59

## 2020-03-26 RX ADMIN — Medication 5 MILLIGRAM(S): at 05:43

## 2020-03-26 RX ADMIN — Medication 100 MILLIGRAM(S): at 14:00

## 2020-03-26 RX ADMIN — CITALOPRAM 40 MILLIGRAM(S): 10 TABLET, FILM COATED ORAL at 12:46

## 2020-03-26 NOTE — PROGRESS NOTE ADULT - ASSESSMENT
65yo F with rheumatoid arthritis, fibromyalgia, who presented from [facility] facility with 4-5 day of subjective fevers and a dry cough that has resolved. Now COVID-19 positive awaiting placement back at rehab

## 2020-03-26 NOTE — PROGRESS NOTE ADULT - PROBLEM SELECTOR PLAN 2
·  Problem: RA (rheumatoid arthritis).  Plan: Hx of RA, w/ b/l toe deformities, sig joint pain, patient is bedbound  - C/w prednisone 5 mg daily  - C/w sulfasalazine 500 mg TID  - C/w Plaquenil 200 mg BID  - C/w protonix 20 mg daily.

## 2020-03-26 NOTE — PROGRESS NOTE ADULT - ATTENDING COMMENTS
I independently performed the key portions of the evaluation and management service provided. I agree with the above history and plan which I have reviewed and edited where appropriate. Due to the nature of this patient’s COVID-19 isolation status (either confirmed or suspected), this note may have been prepared without a bedside physical examination to prevent spread of infection and to conserve personal protective equipment during this nationwide pandemic. Data reviewed. Patient discussed on rounds with team. Face to face visits and physical examination have been limited only to patients for whom it is required for medical decision making. I find fever improved. Breathing comfortably.  Follow sx, O2 requirements, temp.  See full note. (Patient seen earlier in day.)
seen and examined, agree w above, awaiting COVID-19 neg, plan for SNF. reviewed w resident team plan of care
Stable and awaiting COVID results. Transfer to regional COVID unit.

## 2020-03-26 NOTE — DISCHARGE NOTE NURSING/CASE MANAGEMENT/SOCIAL WORK - PATIENT PORTAL LINK FT
You can access the FollowMyHealth Patient Portal offered by [facility] by registering at the following website: [email or web address]. By joining [employer]’s FollowMyHealth portal, you will also be able to view your health information using other applications (apps) compatible with our system.

## 2020-03-26 NOTE — PROGRESS NOTE ADULT - SUBJECTIVE AND OBJECTIVE BOX
INTERVAL HPI/OVERNIGHT EVENTS:  Saw patient at bedside. Wants to go back to rehab. No cough/CP/SOB. No abdominal pain. Tolerates PO. Currently on 2L O2 NC.    VITALS:    T(F): 98.3 (03-26-20 @ 06:16), Max: 98.3 (03-26-20 @ 06:16)  HR: 78 (03-26-20 @ 06:16) (72 - 92)  BP: 114/71 (03-26-20 @ 06:16) (101/62 - 114/71)  RR: 16 (03-26-20 @ 06:16) (16 - 18)  SpO2: 94% (03-26-20 @ 06:16) (94% - 95%)  Wt(kg): --    I&O's Detail    25 Mar 2020 07:01  -  26 Mar 2020 07:00  --------------------------------------------------------  IN:    Oral Fluid: 125 mL  Total IN: 125 mL    OUT:    Voided: 400 mL  Total OUT: 400 mL    Total NET: -275 mL          MEDICATIONS:    ANTIBIOTICS:  hydroxychloroquine 200 milliGRAM(s) Oral every 12 hours      PAIN CONTROL:  citalopram 40 milliGRAM(s) Oral daily  pregabalin 100 milliGRAM(s) Oral three times a day       MEDS:      HEME/ONC  aspirin enteric coated 81 milliGRAM(s) Oral daily  enoxaparin Injectable 40 milliGRAM(s) SubCutaneous every 24 hours        PHYSICAL EXAM:  General: No acute distress.  Alert and Oriented  Lungs: bilat air entry  Heart: RRR  Abdominal Exam: soft, NT, ND  EXT: no calf tenderness, severe deformities of of hands and feet, peripheral edema      LABS:                RADIOLOGY & ADDITIONAL TESTS:

## 2020-03-26 NOTE — PROGRESS NOTE ADULT - PROBLEM SELECTOR PLAN 3
Hx of fibromyalgia, was taking percocet in the past for pain control  - C/w tylenol for pain control  - C/w pregablin 100 mg TID  - C/w propanolol 80 mg (for migraine).

## 2020-03-26 NOTE — PROGRESS NOTE ADULT - PROBLEM SELECTOR PLAN 7
·  Problem: Transition of care performed with sharing of clinical summary.  Plan: 1) PCP Contacted on Admission: (Y/N) --> Name & Phone #: Dr. Padilla, but Dr. Cordero covering.  2) Date of Contact with PCP: 3/15/2020  3) PCP Contacted at Discharge: (Y/N, N/A)  4) Summary of Handoff Given to PCP:   5) Post-Discharge Appointment Date and Location:

## 2020-03-26 NOTE — PROGRESS NOTE ADULT - PROBLEM SELECTOR PLAN 1
·  Problem: Coronavirus infection.  Plan: Improved clinically - now with decreasing O2 requirements and normal RR. She has been asymptomatic for 72 hours. Stable on 3L NC, while being titrated off. Supplemental O2 currently needed due to COVID-19 infection.   - COVID +  - Legionella negative  - c/w Plaquenil 200 mg BID, d/c Azithromycin 500 mg daily, thiamine 200 mg BID    #desaturation  Patient came from [facility] rehab off of supplemental O2 and in house has been requiring nasal cannula (off of nasal cannula O2 sat ~90%). This is likely position dependent with atelectasis. Will d/c O2 via NC and check O2 saturation.  - Repositioning  - incentive spirometer.  Possible d/c to NH today pending O2 saturation off oxygen NC.

## 2020-03-26 NOTE — PROGRESS NOTE ADULT - PROBLEM SELECTOR PLAN 6
·  Problem: Nutrition, metabolism, and development symptoms.  Plan: F: no IVF  E: K>4 Mg>2, monitor and replete as needed  N: DASH diet.

## 2020-04-09 DIAGNOSIS — R50.9 FEVER, UNSPECIFIED: ICD-10-CM

## 2020-04-09 DIAGNOSIS — Z79.899 OTHER LONG TERM (CURRENT) DRUG THERAPY: ICD-10-CM

## 2020-04-09 DIAGNOSIS — Z79.82 LONG TERM (CURRENT) USE OF ASPIRIN: ICD-10-CM

## 2020-04-09 DIAGNOSIS — M79.7 FIBROMYALGIA: ICD-10-CM

## 2020-04-09 DIAGNOSIS — E03.9 HYPOTHYROIDISM, UNSPECIFIED: ICD-10-CM

## 2020-04-09 DIAGNOSIS — J98.11 ATELECTASIS: ICD-10-CM

## 2020-04-09 DIAGNOSIS — F32.9 MAJOR DEPRESSIVE DISORDER, SINGLE EPISODE, UNSPECIFIED: ICD-10-CM

## 2020-04-09 DIAGNOSIS — Z79.52 LONG TERM (CURRENT) USE OF SYSTEMIC STEROIDS: ICD-10-CM

## 2020-04-09 DIAGNOSIS — Z74.01 BED CONFINEMENT STATUS: ICD-10-CM

## 2020-04-09 DIAGNOSIS — E66.9 OBESITY, UNSPECIFIED: ICD-10-CM

## 2020-04-09 DIAGNOSIS — J98.6 DISORDERS OF DIAPHRAGM: ICD-10-CM

## 2020-04-09 DIAGNOSIS — Z88.1 ALLERGY STATUS TO OTHER ANTIBIOTIC AGENTS STATUS: ICD-10-CM

## 2020-04-09 DIAGNOSIS — J12.89 OTHER VIRAL PNEUMONIA: ICD-10-CM

## 2020-04-09 DIAGNOSIS — M81.0 AGE-RELATED OSTEOPOROSIS WITHOUT CURRENT PATHOLOGICAL FRACTURE: ICD-10-CM

## 2020-04-09 DIAGNOSIS — M06.9 RHEUMATOID ARTHRITIS, UNSPECIFIED: ICD-10-CM

## 2020-04-09 DIAGNOSIS — B97.29 OTHER CORONAVIRUS AS THE CAUSE OF DISEASES CLASSIFIED ELSEWHERE: ICD-10-CM

## 2020-04-09 DIAGNOSIS — Z66 DO NOT RESUSCITATE: ICD-10-CM

## 2021-10-19 NOTE — PATIENT PROFILE ADULT - NSPROGENOTHERPROVIDER_GEN_A_NUR
rehabilitation therapy Bilateral Helical Rim Advancement Flap Text: The defect edges were debeveled with a #15 blade scalpel.  Given the location of the defect and the proximity to free margins (helical rim) a bilateral helical rim advancement flap was deemed most appropriate.  Using a sterile surgical marker, the appropriate advancement flaps were drawn incorporating the defect and placing the expected incisions between the helical rim and antihelix where possible.  The area thus outlined was incised through and through with a #15 scalpel blade.  With a skin hook and iris scissors, the flaps were gently and sharply undermined and freed up.

## 2024-04-29 NOTE — PROGRESS NOTE ADULT - PROBLEM SELECTOR PROBLEM 4
Advance Care Planning   Healthcare Decision Maker:    Primary Decision Maker: Andres Mcgarry - Parent - [phone]    Secondary Decision Maker: Mitra Mcgarry - Parent - [phone]    Click here to complete Healthcare Decision Makers including selection of the Healthcare Decision Maker Relationship (ie \"Primary\").            
Depression
Osteoporosis

## 2024-06-18 NOTE — PROGRESS NOTE ADULT - PROBLEM/PLAN-2
Subjective   Patient ID: Leni Graham is a 79 y.o. female who presents for pessary maintenance.  HPI   79-year-old with atrophic vaginitis and uterine prolapse with urge urinary incontinence and #3 ring without support pessary in place.    The patient presents with complaints of burning around her vagina, itching and irritation for the last 2 weeks.  It farr snot hurt when she wipes. However it does burn when she voids. She has been utilizing AZO with some relief. She denies any  abnormal bleeding or discharge.  She is utilizing the vaginal estrogen cream. She deneis any issues with the pessary.        Her cultures have been negative.     She also notes urinary urgency and frequency complaints with worsening incontinece. She denies any episodes of nocturia or enuresis.     She denies any bowel related complaints, no fecal or flatal incontinence.    She has no other complaints.   From Previous note   79-year-old with atrophic vaginitis and uterine prolapse with urge urinary incontinence and #3 ring without support pessary in place.     The patient appears to be doing well, she denies any vaginal complaints, no abnormal bleeding or discharge.  She is utilizing the vaginal estrogen cream.    She denies any significant changes in her LUTS. She denies any UTI like symptoms.    She denies any bowel related complaints, no fecal or flatal incontinence.    She has no other complaints.    From Previous note   79-year-old with atrophic vaginitis and uterine prolapse with urge urinary incontinence and #3 ring without support pessary in place.    The patient appears to be doing well, she denies any vaginal complaints, no abnormal bleeding or discharge.  She is utilizing the vaginal estrogen cream.    She denies any significant changes in her LUTS. She denies any UTI like symptoms.    She denies any bowel related complaints, no fecal or flatal incontinence.    She has no other complaints.    From Previous note  77-year-old  presenting for follow-up with history of urge urinary incontinence and #3 ring pessary in place.     The patient feels very well controlled from a urinary urgency and urge related incontinence standpoint utilizing just her pessary. She has not utilized any anticholinergics due to cost and feels overall very well controlled. She notes 1-2 episode of nocturia and denies enuresis. She notes daytime frequency every 2-3 hours.     She does not desire any medical therapy at this time.     She denies any vaginal complaints. She is overall very satisfied with her pessary. She does require refill on her vaginal estrogen therapy.     She has no new complaints.     From previous note:  73-year-old  presenting as a referral from Dr. Celaya for concerns for chronic urinary tract infections.     Patient denies urge or stress urinary incontinence symptoms. She denies enuresis. She notes nocturia 1-2 times at night. She denies leaking urine with laughing coughing and sneezing. She denies dysuria or hematuria.     She has noted over the last 2 months a vaginal bulge when she wipes. She is not sexually active. She denies any vaginal bleeding.     The patient was recently hospitalized for intractable nausea and vomiting. During her hospital course she was noted to have blood and leuk esterase in her urine and was treated empirically with antibiotics for a presumed urinary tract infection. A careful analysis of the last year of urinalyses and cultures demonstrate trace or negative blood with positive leukocytes and or leuk esterase. She denies any urinary urgency symptoms over the last 2 months and denies any urinary tract infection symptoms during her hospitalization stay.     She is working with Dr. Lubin for a prescription for her nausea complaints. Per her report a thorough workup while hospitalized was negative for these complaints.  Review of Systems  Constitutional: No fever, No chills and No fatigue.   Eyes: No vision  problems and No dryness of the eyes.   ENT: No dry mouth, No hearing loss and No nosebleeds.   Cardiovascular: No chest pain, No palpitations and No orthopnea.   Respiratory: No shortness of breath, No cough and No wheezing.   Gastrointestinal: No abdominal pain, No constipation, No nausea, No diarrhea, No vomiting and No melena.   Genitourinary: As noted in HPI.   Musculoskeletal: No back pain, No myalgias, No muscle weakness, No joint swelling and No leg edema.   Integumentary: No rashes, No skin lesion and No itching.   Neurological: No headache, No numbness and No dizziness.   Psychiatric: No sleep disturbances, No anxiety and No depression.   Endocrine: No hot flashes, No loss of hair and No hirsutism.   Hematologic/Lymphatic: No swollen glands, No tendency for easy bleeding and No tendency for easy bruising.   All other systems have been reviewed and are negative for complaint.        Objective   Physical Exam    PHYSICAL EXAMINATION:  No LMP recorded.  There is no height or weight on file to calculate BMI.  There were no vitals taken for this visit.  General Appearance: well appearing  Neuro: Alert and oriented   HEENT: mucous membranes moist, neck supple  Resp: No respiratory distress, normal work of breathing  MSK: normal range of motion, gait appropriate    The patient's #3 ring without support pessary was removed without difficulty.  There was no significant irritation or other abnormalities.  There was no significant pain to palpation.  GenPath was obtained.    Assessment/Plan      79-year-old with atrophic vaginitis and uterine prolapse with urge urinary incontinence and #3 ring without support pessary in place with worsening vaginitis and vaginal irritation complaints.     #1 we again discussed the [facility] OAB care pathway including first, second, and third line therapies. We discussed fluid management strategies as well as benefits of pelvic floor physical therapy. The patient does have good pelvic floor  strength and no pain. Trospium is cost prohibitive and she did not attempt this medication. She was previously prescribed solifenacin but did not fill this. She presently feels that she is well controlled from an urgency standpoint and does not desire any medication therapy. We have also previously discussed third line therapies including PTNS, Botox, and InterStim.  She has recently noted 2 weeks of worsening urinary urgency and frequency complaints.  Will reevaluate the need to start a OAB medication following treatment of her vaginal complaints.     #2 she appears to be having excellent results with her pessary and wishes to continue this moving forward.  Her anterior and posterior vaginal wall are well supported but this patient does have atypical dissent with cervical elongation. She appears to be having excellent results with her #3 ring without support. The patient will require follow-up in November 2024.     #3 We discussed the safety, efficacy, proper utilization of vaginal estrogen therapy. She will continue this 3 times a week moving forward.  She will be empirically started on fluconazole therapy now and clobetasol therapy.  Pending GEN path.     #4 the patient will follow up in November 2024 for pessary maintenance.  She will contact the clinic in 3 days should she have no significant improvements in her vaginal complaints and discuss possible OAB therapy versus diazepam for her pelvic floor complaints.     LIBORIO Rocha MD     Scribe Attestation  By signing my name below, ITerra Scribe attest that this documentation has been prepared under the direction and in the presence of John Rocha MD. All medical record entries made by the Scribe were at my direction or personally dictated by me. I have reviewed the chart and agree that the record accurately reflects my personal performance of the history, physical exam, discussion and plan.     DISPLAY PLAN FREE TEXT